# Patient Record
Sex: MALE | Race: WHITE | NOT HISPANIC OR LATINO | Employment: FULL TIME | URBAN - NONMETROPOLITAN AREA
[De-identification: names, ages, dates, MRNs, and addresses within clinical notes are randomized per-mention and may not be internally consistent; named-entity substitution may affect disease eponyms.]

---

## 2017-02-13 ENCOUNTER — AMBULATORY - GICH (OUTPATIENT)
Dept: SCHEDULING | Facility: OTHER | Age: 62
End: 2017-02-13

## 2017-02-17 ENCOUNTER — TELEPHONE (OUTPATIENT)
Dept: OPHTHALMOLOGY | Facility: CLINIC | Age: 62
End: 2017-02-17

## 2017-02-17 DIAGNOSIS — H05.89 ORBITAL MASS: Primary | ICD-10-CM

## 2017-02-17 NOTE — TELEPHONE ENCOUNTER
Last MRI done about 3 years ago per pt  No new symptoms per pt since last seen    Pt has appt feb 27th with dr. Haq and would like to know if MRI should be done prior to appt  H/o orbital mass    Pt aware facilitator/dr. haq to review Monday and f/u with pt  Jose Juan Harris RN 11:21 AM 02/17/17

## 2017-02-17 NOTE — TELEPHONE ENCOUNTER
----- Message from Abigail Wall sent at 2/17/2017 10:17 AM CST -----  Regarding: New Appt - Dr Linder  Contact: [phone]  The pt would like an eye exam the same day, and is wondering if Dr Linder would like an MRI prior to the appt?  Please call the pt at [phone] to let him know if he needs to come in early for the MRI.    Thanks - Abigail    Please DO NOT send this message and/or reply back to sender.  Call Center Representatives DO NOT respond to messages.

## 2017-02-20 ENCOUNTER — HISTORY (OUTPATIENT)
Dept: UROLOGY | Facility: OTHER | Age: 62
End: 2017-02-20

## 2017-02-20 ENCOUNTER — OFFICE VISIT - GICH (OUTPATIENT)
Dept: UROLOGY | Facility: OTHER | Age: 62
End: 2017-02-20

## 2017-02-20 ENCOUNTER — COMMUNICATION - GICH (OUTPATIENT)
Dept: UROLOGY | Facility: OTHER | Age: 62
End: 2017-02-20

## 2017-02-20 ENCOUNTER — AMBULATORY - GICH (OUTPATIENT)
Dept: UROLOGY | Facility: OTHER | Age: 62
End: 2017-02-20

## 2017-02-20 DIAGNOSIS — I10 ESSENTIAL (PRIMARY) HYPERTENSION: ICD-10-CM

## 2017-02-20 DIAGNOSIS — E78.2 MIXED HYPERLIPIDEMIA: ICD-10-CM

## 2017-02-20 DIAGNOSIS — E66.9 OBESITY: ICD-10-CM

## 2017-02-20 DIAGNOSIS — K44.9 DIAPHRAGMATIC HERNIA WITHOUT OBSTRUCTION OR GANGRENE: ICD-10-CM

## 2017-02-20 DIAGNOSIS — R97.20 ELEVATED PROSTATE SPECIFIC ANTIGEN (PSA): ICD-10-CM

## 2017-02-20 LAB — PSA TOTAL (DIAGNOSTIC) - HISTORICAL: 3 NG/ML

## 2017-02-22 DIAGNOSIS — D31.60 BENIGN NEOPLASM OF ORBIT, UNSPECIFIED LATERALITY: Primary | ICD-10-CM

## 2017-02-22 RX ORDER — DIAZEPAM 10 MG
10 TABLET ORAL EVERY 6 HOURS PRN
Qty: 1 TABLET | Refills: 0 | Status: SHIPPED | OUTPATIENT
Start: 2017-02-22 | End: 2018-12-05

## 2017-02-22 RX ORDER — DIAZEPAM 10 MG
10 TABLET ORAL EVERY 6 HOURS PRN
Qty: 1 TABLET | Refills: 0 | Status: SHIPPED | OUTPATIENT
Start: 2017-02-22 | End: 2017-02-22

## 2017-02-27 ENCOUNTER — OFFICE VISIT (OUTPATIENT)
Dept: OPHTHALMOLOGY | Facility: CLINIC | Age: 62
End: 2017-02-27

## 2017-02-27 DIAGNOSIS — H05.89 ORBITAL MASS: Primary | ICD-10-CM

## 2017-02-27 DIAGNOSIS — J34.1 MUCOUS RETENTION CYST OF MAXILLARY SINUS: ICD-10-CM

## 2017-02-27 DIAGNOSIS — J32.2 CHRONIC ETHMOIDAL SINUSITIS: ICD-10-CM

## 2017-02-27 RX ORDER — ZOLPIDEM TARTRATE 5 MG/1
TABLET ORAL
COMMUNITY
Start: 2017-02-02

## 2017-02-27 RX ORDER — TAMSULOSIN HYDROCHLORIDE 0.4 MG/1
CAPSULE ORAL
COMMUNITY
Start: 2017-02-20 | End: 2019-11-21

## 2017-02-27 RX ORDER — ATENOLOL AND CHLORTHALIDONE TABLET 50; 25 MG/1; MG/1
TABLET ORAL
COMMUNITY
Start: 2017-02-20 | End: 2018-03-05

## 2017-02-27 RX ORDER — SILDENAFIL 50 MG/1
TABLET, FILM COATED ORAL
COMMUNITY
Start: 2016-02-05 | End: 2018-03-05

## 2017-02-27 ASSESSMENT — SLIT LAMP EXAM - LIDS
COMMENTS: NORMAL
COMMENTS: NORMAL

## 2017-02-27 ASSESSMENT — VISUAL ACUITY
OS_SC+: -1
OS_SC: 20/20
OD_SC: 20/20
METHOD: SNELLEN - LINEAR

## 2017-02-27 ASSESSMENT — CUP TO DISC RATIO
OS_RATIO: 0.3
OD_RATIO: 0.3

## 2017-02-27 ASSESSMENT — TONOMETRY
OD_IOP_MMHG: 15
OS_IOP_MMHG: 15
IOP_METHOD: TONOPEN

## 2017-02-27 ASSESSMENT — CONF VISUAL FIELD
OD_NORMAL: 1
METHOD: COUNTING FINGERS
OS_NORMAL: 1

## 2017-02-27 ASSESSMENT — EXTERNAL EXAM - RIGHT EYE: OD_EXAM: NORMAL

## 2017-02-27 ASSESSMENT — EXTERNAL EXAM - LEFT EYE: OS_EXAM: NORMAL

## 2017-02-27 NOTE — LETTER
2017         RE:  :  MRN: Malik Israel  1955  [id number]     Dear ,    Thank you for asking me to see your patient, Malik Israel, for an oculoplastic   consultation.  My assessment and plan are below.  For further details, please see my attached clinic note.          Assessment & Plan     Malik Israel is a 61 year old male with the following diagnoses:   1. Orbital mass    2. Chronic ethmoidal sinusitis    3. Mucous retention cyst of maxillary sinus     differential diagnosis - hemangioma, varix, lymphangioma    Mass on MRI today appears stable/smaller, no new visual symptoms  MRI shows ethmoid and maxillary sinus changes  G-top normal today  Augmentin 875mg orally twice a day for 10 days    Return to clinic 1 year - exam and gtop  Repeat imagaging in 2 years          Again, thank you for allowing me to participate in the care of your patient.      Sincerely,    Sander Linder MD  Department of Ophthalmology and Visual Neurosciences  [facility]    CC: Anabella Ro NP  [facility] [address]  VIA Facsimile: [phone]      n/a

## 2017-02-27 NOTE — NURSING NOTE
Chief Complaints and History of Present Illnesses   Patient presents with     Follow Up For     Orbital mass     HPI    Affected eye(s):  Both   Symptoms:     No blurred vision   No decreased vision      Frequency:  Constant       Do you have eye pain now?:  No      Comments:  Pt states no changes to vision only wears gls for reading. No pain. No drops.    Sai CASH 9:04 AM February 27, 2017

## 2017-02-27 NOTE — MR AVS SNAPSHOT
After Visit Summary   2/27/2017    Malik Israel    MRN: [id number]           Patient Information     Date Of Birth          1955        Visit Information        Provider Department      2/27/2017 9:00 AM Sander Linder MD [facility]        Today's Diagnoses     Orbital mass    -  1    Chronic ethmoidal sinusitis        Mucous retention cyst of maxillary sinus           Follow-ups after your visit        Follow-up notes from your care team     Return in about 1 year (around 2/27/2018) for RETURN OCULOPLASTICS, IOP and DILATE, GTOP.      Who to contact     Please call your clinic at [phone] to:    Ask questions about your health    Make or cancel appointments    Discuss your medicines    Learn about your test results    Speak to your doctor   If you have compliments or concerns about an experience at your clinic, or if you wish to file a complaint, please contact [facility] Patient Relations at [phone] or email us at [email or web address]         Additional Information About Your Visit        MyChart Information     [employer] gives you secure access to your electronic health record. If you see a primary care provider, you can also send messages to your care team and make appointments. If you have questions, please call your primary care clinic.  If you do not have a primary care provider, please call [phone] and they will assist you.      [employer] is an electronic gateway that provides easy, online access to your medical records. With [employer], you can request a clinic appointment, read your test results, renew a prescription or communicate with your care team.     To access your existing account, please contact your [facility] or call [phone] for assistance.        Care EveryWhere ID     This is your Care EveryWhere ID. This could be used by other organizations to access your [facility]  records  [id number]         Blood Pressure from Last 3 Encounters:   03/26/14 118/80   02/27/14 118/84    Weight from Last 3 Encounters:   03/26/14 102.1 kg (225 lb)   02/27/14 92.1 kg (203 lb)              We Performed the Following     Glaucoma Top OU          Today's Medication Changes          These changes are accurate as of: 2/27/17 10:11 AM.  If you have any questions, ask your nurse or doctor.               Start taking these medicines.        Dose/Directions    amoxicillin-clavulanate 875-125 MG per tablet   Commonly known as:  AUGMENTIN   Used for:  Chronic ethmoidal sinusitis   Started by:  Sander Linder MD        Dose:  1 tablet   Take 1 tablet by mouth 2 times daily   Quantity:  20 tablet   Refills:  0            Where to get your medicines      These medications were sent to [address] [place] [address] [address] [address]    Hours:  TRANSPLANT PHONE NUMBER [phone] Phone:  [phone]     amoxicillin-clavulanate 875-125 MG per tablet                Primary Care Provider Office Phone # Fax #    Anabella Foremanzdanich [phone]       [facility] [address] [facility] [address]        Thank you!     Thank you for choosing [facility]  for your care. Our goal is always to provide you with excellent care. Hearing back from our patients is one way we can continue to improve our services. Please take a few minutes to complete the written survey that you may receive in the mail after your visit with us. Thank you!             Your Updated Medication List - Protect others around you: Learn how to safely use, store and throw away your medicines at www.disposemymeds.org.          This list is accurate as of: 2/27/17 10:11 AM.  Always use your most recent med list.                   Brand Name Dispense Instructions for use    amoxicillin-clavulanate 875-125 MG per tablet    AUGMENTIN    20  tablet    Take 1 tablet by mouth 2 times daily       ATENOLOL PO      Take 5 mg by mouth daily       atenolol-chlorthalidone 50-25 MG per tablet    TENORETIC     TAKE 1/2 TABLET BY MOUTH EVERY DAY       CENTRUM Tabs          CITRUCEL PO          diazepam 10 MG tablet    VALIUM    1 tablet    Take 1 tablet (10 mg) by mouth every 6 hours as needed for anxiety or sleep Take 30-60 minutes before procedure.  Do not operate a vehicle after taking this medication.       fish oil-omega-3 fatty acids 1000 MG capsule      Take by mouth daily       MULTI-VITAMIN DAILY PO          PREVACID 30 MG CR capsule   Generic drug:  LANsoprazole      Take 30 mg by mouth daily       sildenafil 50 MG cap/tab    REVATIO/VIAGRA     Use as directed       tamsulosin 0.4 MG capsule    FLOMAX     TAKE 1 CAPSULE BY MOUTH EVERY DAY. SWALLOW WHOLE. DO NOT CRUSH, CHEW, OR OPEN       zolpidem 5 MG tablet    AMBIEN     TAKE 1-2 TABLETS BY MOUTH AT BEDTIME

## 2017-02-27 NOTE — PROGRESS NOTES
Chief Complaints and History of Present Illnesses   Patient presents with     Follow Up For     Orbital mass     Malik Israel is a 61 year old male who has history of an orbital mass found incidentally. Reports vision has been stable, no new headaches. In January, he reports had some pre-syncopal episodes, he thinks associated with a viral illness as he had lymphadenopathy several days afterwards. Does reports he has a sharp pain in his left eye intermittently for a few seconds. Has only occurred a couple times. No vision changes. Also notes sinus sxs for last 1.5 months.          Assessment & Plan     Malik Israel is a 61 year old male with the following diagnoses:   1. Orbital mass    2. Chronic ethmoidal sinusitis    3. Mucous retention cyst of maxillary sinus     differential diagnosis - hemangioma, varix, lymphangioma    Mass on MRI today appears stable/smaller, no new visual symptoms  MRI shows ethmoid and maxillary sinus changes  G-top normal today  Augmentin 875mg orally twice a day for 10 days    Return to clinic 1 year - exam and gtop  Repeat imagaging in 2 years         Attending Physician Attestation:  I have seen and examined this patient .  I have confirmed and edited as necessary the chief complaint(s), history of present illness, review of systems, relevant history, and examination findings as documented by others.  I have personally reviewed the relevant tests, images, and reports as documented above.  I have confirmed and edited as necessary the assessment and plan and agree with this note.    - Sander Linder MD 9:55 AM 2/27/2017    Photographs were obtained to document the findings recorded under exam. These will be stored for future reference and comparison. The plan is documented under assessment and plan above.   - Sander Linder MD 9:55 AM 2/27/2017    Nestor Wolf MD

## 2017-11-15 ENCOUNTER — AMBULATORY - GICH (OUTPATIENT)
Dept: UROLOGY | Facility: OTHER | Age: 62
End: 2017-11-15

## 2017-11-15 ENCOUNTER — COMMUNICATION - GICH (OUTPATIENT)
Dept: UROLOGY | Facility: OTHER | Age: 62
End: 2017-11-15

## 2017-11-15 DIAGNOSIS — R97.20 ELEVATED PROSTATE SPECIFIC ANTIGEN (PSA): ICD-10-CM

## 2017-11-28 ENCOUNTER — AMBULATORY - GICH (OUTPATIENT)
Dept: LAB | Facility: OTHER | Age: 62
End: 2017-11-28

## 2017-11-28 DIAGNOSIS — R97.20 ELEVATED PROSTATE SPECIFIC ANTIGEN (PSA): ICD-10-CM

## 2017-11-28 LAB — PSA TOTAL (DIAGNOSTIC) - HISTORICAL: 3.4 NG/ML

## 2017-11-29 ENCOUNTER — OFFICE VISIT - GICH (OUTPATIENT)
Dept: UROLOGY | Facility: OTHER | Age: 62
End: 2017-11-29

## 2017-11-29 ENCOUNTER — HISTORY (OUTPATIENT)
Dept: UROLOGY | Facility: OTHER | Age: 62
End: 2017-11-29

## 2017-11-29 DIAGNOSIS — N52.9 MALE ERECTILE DYSFUNCTION: ICD-10-CM

## 2017-11-29 DIAGNOSIS — R97.20 ELEVATED PROSTATE SPECIFIC ANTIGEN (PSA): ICD-10-CM

## 2017-12-28 NOTE — PROGRESS NOTES
"Patient Information     Patient Name MRMalik Kiser III [id number] Male 1955      Progress Notes by Linus Obando MD at 2017  8:15 AM     Author:  Linus Obando MD Service:  (none) Author Type:  Physician     Filed:  2017  9:41 AM Encounter Date:  2017 Status:  Signed     :  Linus Obando MD (Physician)            PCP:   SHARON Cabrera    Type of Visit  EST    Chief Complaint  Elevated PSA    HPI  Mr. Lindy ELENA is a 61 y.o. male who follows up with an elevated PSA.  He does not have a family history of prostate cancer.  The patient has not previously undergone prostate biopsy.  No associated worsening LUTS, dysuria or prostatitis at the time of the PSA.  The most recent PSA was collected yesterday.  I initially saw the patient 9 months ago with an elevated PSA.  We had discussed prostate biopsy however we did do a recheck same day of clinic visit and I returned lower so we elected to defer and follow-up PSA.      Family History  No family history of urologic cancers    Review of Systems  I personally reviewed the ROS with the patient.    Nursing Notes:   Magdalena Joyner RN  2017  8:20 AM  Signed  Review of Systems:    Weight loss:    No     Recent fever/chills:  No   Night sweats:   No  Current skin rash:  No   Recent hair loss:  No  Heat intolerance:  No   Cold intolerance:  No  Chest pain:   No   Palpitations:   No  Shortness of breath:  No   Wheezing:   No  Constipation:    No   Diarrhea:   No   Nausea:   No   Vomiting:   No   Kidney/side pain:  No   Back pain:   No  Frequent headaches:  No   Dizziness:     No  Leg swelling:   No   Calf pain:    No      Physical Exam  Vitals:     17 1111   BP: 122/76   Resp: 12   Weight: 99.6 kg (219 lb 9.6 oz)   Height: 1.765 m (5' 9.5\")   Constitutional: No acute distress.  Alert and cooperative   Head: NCAT  Eyes: Conjunctivae normal  Cardiovascular: Regular rate.  Pulmonary/Chest: Respirations are even and " non-labored bilaterally, no audible wheezing  Abdominal: Soft. No distension, tenderness, masses or guarding.   Neurological: A + O x 3.  Cranial Nerves II-XII grossly intact.  Extremities: CARMEN x 4, Warm. No clubbing.  No cyanosis.    Skin: Pink, warm and dry.  No visible rashes noted.  Psychiatric:  Normal mood and affect  Back:  No left CVA tenderness.  No right CVA tenderness.  Genitourinary:  Nonpalpable bladder  LLUVIA today revealed a 30 g prostate, no nodules, no asymmetry, no tenderness    Labs  PSA 4.26 1/16/2017    Results for ERIC JOSHI III (MRN [id number]) as of 11/29/2017 08:14   2/20/2017 11:54 11/28/2017 08:35   PSA TOTAL (DIAGNOSTIC) 3.000 3.398 (H)       Assessment  Mr. Lindy ELENA is a 62 y.o. male who follows up with elevated but stable PSA.  Normal LLUVIA.  Discussed the indications for prostate biopsy.  Also discussed the alternative of close PSA surveillance.  I explained to the patient that an elevated PSA is a marker of risk of prostate cancer and a prostate biopsy oftentimes is the next step.    We discussed the following percentages:    5% Risk of high grade cancer detected on biopsy  17% Risk of low grade cancer detected on biopsy  78% Chance of benign biopsy    Plan  Follow up with me in 1 year with PSA and PVR  Continue Flomax 0.4mg daily

## 2017-12-28 NOTE — TELEPHONE ENCOUNTER
Patient Information     Patient Name MRN Malik Whitt III [id number] Male 1955      Telephone Encounter by Aav Burns at 11/15/2017  9:16 AM     Author:  Ava Burns Service:  (none) Author Type:  (none)     Filed:  11/15/2017  9:16 AM Encounter Date:  11/15/2017 Status:  Signed     :  Ava Burns            Order is done and signed.  Ava Burns LPN  11/15/2017  9:16 AM

## 2017-12-28 NOTE — TELEPHONE ENCOUNTER
Patient Information     Patient Name MRN Sex Malik Kunz III [id number] Male 1955      Telephone Encounter by Payton Rivera at 11/15/2017  9:08 AM     Author:  Payton Rivera Service:  (none) Author Type:  (none)     Filed:  11/15/2017  9:09 AM Encounter Date:  11/15/2017 Status:  Signed     :  Payton Rivera            This patient is coming for lab on , please place order  Thank you

## 2017-12-30 NOTE — NURSING NOTE
Patient Information     Patient Name MRN Sex Malik Kunz III [id number] Male 1955      Nursing Note by Magdalena Joyner RN at 2017  8:15 AM     Author:  Magdalena Joyner RN Service:  (none) Author Type:  NURS- Registered Nurse     Filed:  2017  8:20 AM Encounter Date:  2017 Status:  Signed     :  Magdalena Joyner RN (NURS- Registered Nurse)            Review of Systems:    Weight loss:    No     Recent fever/chills:  No   Night sweats:   No  Current skin rash:  No   Recent hair loss:  No  Heat intolerance:  No   Cold intolerance:  No  Chest pain:   No   Palpitations:   No  Shortness of breath:  No   Wheezing:   No  Constipation:    No   Diarrhea:   No   Nausea:   No   Vomiting:   No   Kidney/side pain:  No   Back pain:   No  Frequent headaches:  No   Dizziness:     No  Leg swelling:   No   Calf pain:    No

## 2018-01-03 NOTE — NURSING NOTE
Patient Information     Patient Name MRMalik Kiser III [id number] Male 1955      Nursing Note by Ava Burns at 2017 11:15 AM     Author:  Ava Burns Service:  (none) Author Type:  (none)     Filed:  2017 11:24 AM Encounter Date:  2017 Status:  Signed     :  Ava Burns            Here for elevated PSA.  Review of Systems:    Weight loss:    No     Recent fever/chills:  No   Night sweats:   No  Current skin rash:  No   Recent hair loss:  No  Heat intolerance:  No   Cold intolerance:  No  Chest pain:   No   Palpitations:   No  Shortness of breath:  No   Wheezing:   No  Constipation:    Yes   Diarrhea:   Yes   Nausea:   No   Vomiting:   No   Kidney/side pain:  No   Back pain:   No  Frequent headaches:  No   Dizziness:     No  Leg swelling:   No   Calf pain:    No        Parents, brothers or sisters with history of kidney cancer?   No  Parents, brothers or sisters with history of bladder cancer: No    Ava Burns LPN  2017  11:13 AM

## 2018-01-03 NOTE — TELEPHONE ENCOUNTER
Patient Information     Patient Name MRMalik Kiser III [id number] Male 1955      Telephone Encounter by Ava Burns at 2017  1:43 PM     Author:  Ava Burns Service:  (none) Author Type:  (none)     Filed:  2017  1:50 PM Encounter Date:  2017 Status:  Signed     :  Ava Burns              Per Linus Obando MD  PSA came back as 3.0   For his age that is a borderline value.   Let's hold off on the biopsy.   If he wants to still do the biopsy we can.   I would just plan on seeing him back in 6 months with a PSA.  Pt notified and he will do the PSA in 6 months and see Dr. Obando after.  Ava Burns LPN  2017  1:49 PM

## 2018-01-03 NOTE — PROGRESS NOTES
Patient Information     Patient Name MRMARTINA Sex     Malik Israel III [id number] Male 1955      Progress Notes by Linus Obando MD at 2017 11:15 AM     Author:  Linus Obando MD Service:  (none) Author Type:  Physician     Filed:  2017 11:58 AM Encounter Date:  2017 Status:  Signed     :  Linus Obando MD (Physician)            I was asked to see this patient by SHARON Cabrera and provide my opinion about the following:  Elevated PSA    Type of Visit  Consult    Chief Complaint  Elevated PSA    HPI  Mr. Lindy ELENA is a 61 y.o. male who presents with an elevated PSA.  He does not have a family history of prostate cancer.  The patient has not previously undergone prostate biopsy.  No associated worsening LUTS, dysuria or prostatitis at the time of the PSA.  The most recent PSA was collected 1 month(s) ago.    He states his historical PSAs have been trending upward.  He does have some urinary complaints, primarily nocturia, frequency and urgency.  He has been on Flomax with some improvement in nocturia and frequency.      Past Medical History  He  has a past medical history of Acute bacterial sinusitis (10/12/2007); Asbestosis (HC) (2007); Colitis (2006); Contusion of elbow (2007); ED (erectile dysfunction); Elevated cholesterol (22081); Esophageal reflux (2007); Insomnia (2007); Irritable bowel syndrome (2007); Polycystic kidney (2006); Tick bite (2009); and Unspecified essential hypertension (2007).  Patient Active Problem List     Diagnosis  Code     Essential hypertension I10     Ulcerative colitis (HC) K51.90     Medial epicondylitis M77.00     ED (erectile dysfunction) N52.9     Esophageal reflux K21.9     Hiatal hernia K44.9     Obesity (BMI 30.0-34.9) E66.9     Mixed hyperlipidemia E78.2       Past Surgical History  He  has no past surgical history on file.    Medications  He has a current medication list which includes  "the following prescription(s): atenolol-chlorthalidone (50-25 mg), lansoprazole, multivitamin cmb no.21-iron-fa, sildenafil citrate, tamsulosin, and zolpidem.    Allergies  No Known Allergies    Social History  He  reports that he has never smoked. He has never used smokeless tobacco. He reports that he drinks alcohol. He reports that he does not use illicit drugs.  No drug abuse.    Family History  No family history of urologic cancers    Review of Systems  I personally reviewed the ROS with the patient.    Nursing Notes:   Ava Burns  2/20/2017 11:24 AM  Signed  Here for elevated PSA.  Review of Systems:    Weight loss:    No     Recent fever/chills:  No   Night sweats:   No  Current skin rash:  No   Recent hair loss:  No  Heat intolerance:  No   Cold intolerance:  No  Chest pain:   No   Palpitations:   No  Shortness of breath:  No   Wheezing:   No  Constipation:    Yes   Diarrhea:   Yes   Nausea:   No   Vomiting:   No   Kidney/side pain:  No   Back pain:   No  Frequent headaches:  No   Dizziness:     No  Leg swelling:   No   Calf pain:    No        Parents, brothers or sisters with history of kidney cancer?   No  Parents, brothers or sisters with history of bladder cancer: No    Ava Oakleyer LPN  2/20/2017  11:13 AM          Physical Exam  Vitals:     02/20/17 1111   BP: 122/76   Resp: 12   Weight: 99.6 kg (219 lb 9.6 oz)   Height: 1.765 m (5' 9.5\")   Constitutional: No acute distress.  Alert and cooperative   Head: NCAT  Eyes: Conjunctivae normal  Cardiovascular: Regular rate.  Pulmonary/Chest: Respirations are even and non-labored bilaterally, no audible wheezing  Abdominal: Soft. No distension, tenderness, masses or guarding.   Neurological: A + O x 3.  Cranial Nerves II-XII grossly intact.  Extremities: CARMEN x 4, Warm. No clubbing.  No cyanosis.    Skin: Pink, warm and dry.  No visible rashes noted.  Psychiatric:  Normal mood and affect  Back:  No left CVA tenderness.  No right CVA " tenderness.  Genitourinary:  Nonpalpable bladder    Labs  PSA 4.26 1/16/2017    Assessment  Mr. Lindy ELENA is a 61 y.o. male who presents with elevated PSA.  Discussed the risks of biopsy leading to overdiagnosis and overtreatment.  I explained to the patient that an elevated PSA is a marker of risk of prostate cancer and a prostate biopsy oftentimes is the next step if diagnosis is the goal.  I explained that sampling error can occur with any biopsy and there is a risk of potentially missing a cancer that may be present.    5% Risk of high grade cancer detected on biopsy  18% Risk of low grade cancer detected on biopsy  77% Chance of benign biopsy    I discussed the risks, benefits, and alternatives to prostate biopsy, including hematuria, hematochezia, and hematospermia.  I also discussed the risk of diagnosing a clinically-insignificant prostate cancer.  I discussed the risks of sepsis, which can be minimized by prophylactic antibiotics.     Discussed anticholinergics for his overactive bladder symptoms however given the potential side effects and the low degree of bother he prefers to avoid them.    Plan  PSA today  Continue Flomax 0.4mg daily  Recommended TRUS biopsy of prostate   Gentamicin 120mg IM prior to the biopsy.   Ciprofloxacin 500mg po bid for 3 days to start the day prior to biopsy.   Patient denies taking antiplatelets or anticoagulant medication.   Xanax 1mg    -will have a

## 2018-01-03 NOTE — PROGRESS NOTES
Patient Information     Patient Name MRN Sex Malik Kunz III [id number] Male 1955      Progress Notes by Linus Obando MD at 2017 11:15 AM     Author:  Linus Obando MD Service:  (none) Author Type:  Physician     Filed:  2017 11:58 AM Encounter Date:  2017 Status:  Signed     :  Linus Obando MD (Physician)            Disregard

## 2018-01-25 ENCOUNTER — DOCUMENTATION ONLY (OUTPATIENT)
Dept: FAMILY MEDICINE | Facility: OTHER | Age: 63
End: 2018-01-25

## 2018-01-25 VITALS
BODY MASS INDEX: 31.44 KG/M2 | RESPIRATION RATE: 12 BRPM | WEIGHT: 217.8 LBS | HEIGHT: 69 IN | WEIGHT: 219.6 LBS | HEIGHT: 70 IN | RESPIRATION RATE: 20 BRPM | SYSTOLIC BLOOD PRESSURE: 122 MMHG | BODY MASS INDEX: 32.26 KG/M2 | DIASTOLIC BLOOD PRESSURE: 76 MMHG | DIASTOLIC BLOOD PRESSURE: 60 MMHG | SYSTOLIC BLOOD PRESSURE: 110 MMHG | HEART RATE: 56 BPM

## 2018-01-25 PROBLEM — N52.9 ED (ERECTILE DYSFUNCTION): Status: ACTIVE | Noted: 2017-02-20

## 2018-01-25 PROBLEM — K51.90 ULCERATIVE COLITIS (H): Status: ACTIVE | Noted: 2017-02-20

## 2018-01-25 PROBLEM — K21.9 ESOPHAGEAL REFLUX: Status: ACTIVE | Noted: 2017-02-20

## 2018-01-25 PROBLEM — E66.811 OBESITY (BMI 30.0-34.9): Status: ACTIVE | Noted: 2017-02-20

## 2018-01-25 PROBLEM — R97.20 ELEVATED PSA: Status: ACTIVE | Noted: 2017-11-29

## 2018-01-25 PROBLEM — I10 ESSENTIAL HYPERTENSION: Status: ACTIVE | Noted: 2017-02-20

## 2018-01-25 PROBLEM — E78.2 MIXED HYPERLIPIDEMIA: Status: ACTIVE | Noted: 2017-02-20

## 2018-01-25 PROBLEM — M77.00 MEDIAL EPICONDYLITIS: Status: ACTIVE | Noted: 2017-02-20

## 2018-01-25 PROBLEM — K44.9 HIATAL HERNIA: Status: ACTIVE | Noted: 2017-02-20

## 2018-01-25 RX ORDER — SILDENAFIL 50 MG/1
50 TABLET, FILM COATED ORAL DAILY PRN
COMMUNITY
Start: 2017-02-20 | End: 2019-11-21

## 2018-01-25 RX ORDER — LANSOPRAZOLE 30 MG/1
1 CAPSULE, DELAYED RELEASE ORAL DAILY
COMMUNITY
Start: 2016-08-02

## 2018-01-25 RX ORDER — CIPROFLOXACIN 500 MG/1
TABLET, FILM COATED ORAL
COMMUNITY
Start: 2017-02-20 | End: 2018-03-05

## 2018-03-05 ENCOUNTER — OFFICE VISIT (OUTPATIENT)
Dept: OPHTHALMOLOGY | Facility: CLINIC | Age: 63
End: 2018-03-05
Payer: COMMERCIAL

## 2018-03-05 DIAGNOSIS — J34.1 MUCOUS RETENTION CYST OF MAXILLARY SINUS: ICD-10-CM

## 2018-03-05 DIAGNOSIS — H05.89 ORBITAL MASS: Primary | ICD-10-CM

## 2018-03-05 DIAGNOSIS — J32.2 CHRONIC ETHMOIDAL SINUSITIS: ICD-10-CM

## 2018-03-05 RX ORDER — LISINOPRIL AND HYDROCHLOROTHIAZIDE 20; 25 MG/1; MG/1
TABLET ORAL
COMMUNITY
Start: 2018-02-10 | End: 2021-11-01

## 2018-03-05 ASSESSMENT — VISUAL ACUITY
OD_SC: 20/20
OS_SC: 20/20
METHOD: SNELLEN - LINEAR

## 2018-03-05 ASSESSMENT — TONOMETRY
OS_IOP_MMHG: 16
OD_IOP_MMHG: 18
IOP_METHOD: ICARE

## 2018-03-05 ASSESSMENT — CONF VISUAL FIELD
OD_NORMAL: 1
OS_NORMAL: 1

## 2018-03-05 ASSESSMENT — CUP TO DISC RATIO
OD_RATIO: 0.3
OS_RATIO: 0.3

## 2018-03-05 ASSESSMENT — EXTERNAL EXAM - RIGHT EYE: OD_EXAM: NORMAL

## 2018-03-05 ASSESSMENT — EXTERNAL EXAM - LEFT EYE: OS_EXAM: NORMAL

## 2018-03-05 ASSESSMENT — SLIT LAMP EXAM - LIDS
COMMENTS: NORMAL
COMMENTS: NORMAL

## 2018-03-05 NOTE — PROGRESS NOTES
Chief Complaints and History of Present Illnesses   Patient presents with     Follow Up For     Orbital mass     Malik Israel is a 61 year old male who has history of an orbital mass found incidentally. Reports vision has been stable, no new headaches.        Assessment & Plan     Malik Israel is a 61 year old male with the following diagnoses:   1. Orbital mass    2. Chronic ethmoidal sinusitis    3. Mucous retention cyst of maxillary sinus     differential diagnosis - hemangioma, varix, lymphangioma    Mass on MRI, no new visual symptoms  MRI shows ethmoid and maxillary sinus changes  G-top remains normal today    Return to clinic 1 year - exam and repeat imaging.    Christie Dumas MD  Ophthalmic Plastic and Reconstructive Surgery Fellow    Attending Physician Attestation:  Complete documentation of historical and exam elements from today's encounter can be found in the full encounter summary report (not reduplicated in this progress note).  I personally obtained the chief complaint(s) and history of present illness.  I confirmed and edited as necessary the review of systems, past medical/surgical history, family history, social history, and examination findings as documented by others; and I examined the patient myself.  I personally reviewed the relevant tests, images, and reports as documented above.  I formulated and edited as necessary the assessment and plan and discussed the findings and management plan with the patient and family. I personally reviewed the ophthalmic test(s) associated with this encounter, agree with the interpretation(s) as documented by the resident/fellow, and have edited the corresponding report(s) as necessary.   -Sander Linder MD

## 2018-03-05 NOTE — MR AVS SNAPSHOT
After Visit Summary   3/5/2018    Malik Israel    MRN: [id number]           Patient Information     Date Of Birth          1955        Visit Information        Provider Department      3/5/2018 8:00 AM Sander Linder MD [facility] Ophthalmology        Today's Diagnoses     Orbital mass    -  1    Chronic ethmoidal sinusitis        Mucous retention cyst of maxillary sinus           Follow-ups after your visit        Follow-up notes from your care team     Return in about 1 year (around 3/5/2019) for RETURN OCULOPLASTICS, IOP and DILATE, GTOP, RNFL OCT.      Your next 10 appointments already scheduled     Mar 04, 2019  7:45 AM CST   (Arrive by 7:30 AM)   MR ORBIT W/O & W CONTRAST with [address] [facility] [place] [facility] ([facility] [[email or web address] and [facility] [place])    [address] [address] [phone]           Take your medicines as usual, unless your doctor tells you not to. Bring a list of your current medicines to your exam (including vitamins, minerals and over-the-counter drugs).  You may or may not receive intravenous (IV) contrast for this exam pending the discretion of the Radiologist.  You do not need to do anything special to prepare.  The MRI machine uses a strong magnet. Please wear clothes without metal (snaps, zippers). A sweatsuit works well, or we may give you a hospital gown.  Please remove any body piercings and hair extensions before you arrive. You will also remove watches, jewelry, hairpins, wallets, dentures, partial dental plates and hearing aids. You may wear contact lenses, and you may be able to wear your rings. We have a safe place to keep your personal items, but it is safer to leave them at home.  **IMPORTANT** THE INSTRUCTIONS BELOW ARE ONLY FOR THOSE PATIENTS WHO HAVE BEEN PRESCRIBED SEDATION OR GENERAL ANESTHESIA DURING THEIR MRI PROCEDURE:  IF YOUR DOCTOR PRESCRIBED ORAL SEDATION (take medicine to help you relax during your exam):    You must get the medicine from your doctor (oral medication) before you arrive. Bring the medicine to the exam. Do not take it at home. You ll be told when to take it upon arriving for your exam.   Arrive one hour early. Bring someone who can take you home after the test. Your medicine will make you sleepy. After the exam, you may not drive, take a bus or take a taxi by yourself.  IF YOUR DOCTOR PRESCRIBED IV SEDATION:   Arrive one hour early. Bring someone who can take you home after the test. Your medicine will make you sleepy. After the exam, you may not drive, take a bus or take a taxi by yourself.   No eating 6 hours before your exam. You may have clear liquids up until 4 hours before your exam. (Clear liquids include water, clear tea, black coffee and fruit juice without pulp.)  IF YOUR DOCTOR PRESCRIBED ANESTHESIA (be asleep for your exam):   Arrive 1 1/2 hours early. Bring someone who can take you home after the test. You may not drive, take a bus or take a taxi by yourself.   No eating 8 hours before your exam. You may have clear liquids up until 4 hours before your exam. (Clear liquids include water, clear tea, black coffee and fruit juice without pulp.)   You will spend four to five hours in the recovery room.  Please call the Imaging Department at your exam site with any questions.            Mar 04, 2019  8:30 AM CST   (Arrive by 8:15 AM)   RETURN PLASTICS with Sander Linder MD   [facility] ([facility])    [address] [place] [address] [phone]              Future tests that were ordered for you today     Open Future Orders        Priority Expected Expires Ordered    MR Orbit w/o & w Contrast Routine  3/5/2019 3/5/2018            Who to contact     Please call your clinic at [phone] to:    Ask questions about your health    Make or cancel appointments    Discuss your medicines    Learn about your test results    Speak to  your doctor            Additional Information About Your Visit        [employer] Information     [employer] gives you secure access to your electronic health record. If you see a primary care provider, you can also send messages to your care team and make appointments. If you have questions, please call your primary care clinic.  If you do not have a primary care provider, please call [phone] and they will assist you.      [employer] is an electronic gateway that provides easy, online access to your medical records. With [employer], you can request a clinic appointment, read your test results, renew a prescription or communicate with your care team.     To access your existing account, please contact your [facility] or call [phone] for assistance.        Care EveryWhere ID     This is your Care EveryWhere ID. This could be used by other organizations to access your [facility] medical records  [id number]         Blood Pressure from Last 3 Encounters:   11/29/17 110/60   02/20/17 122/76   10/05/16 130/80    Weight from Last 3 Encounters:   11/29/17 98.8 kg (217 lb 12.8 oz)   02/20/17 99.6 kg (219 lb 9.6 oz)   09/24/16 99.5 kg (219 lb 6.4 oz)              We Performed the Following     OVF 24-2 Dynamic OU          Today's Medication Changes          These changes are accurate as of 3/5/18  9:02 AM.  If you have any questions, ask your nurse or doctor.               These medicines have changed or have updated prescriptions.        Dose/Directions    LANsoprazole 30 MG CR capsule   Commonly known as:  PREVACID   This may have changed:  Another medication with the same name was removed. Continue taking this medication, and follow the directions you see here.   Changed by:  Sander Linder MD        Dose:  1 capsule   Take 1 capsule by mouth daily   Refills:  0       sildenafil 50 MG tablet   Commonly known as:  VIAGRA   This may have changed:  Another medication with the same name was  removed. Continue taking this medication, and follow the directions you see here.   Changed by:  Sander Linder MD        Dose:  50 mg   Take 50 mg by mouth daily as needed for erectile dysfunction Take 30 min to 4 hours before sexual activity. Max 100 mg in 24 hrs   Refills:  0         Stop taking these medicines if you haven't already. Please contact your care team if you have questions.     amoxicillin-clavulanate 875-125 MG per tablet   Commonly known as:  AUGMENTIN   Stopped by:  Sander Linder MD           ATENOLOL PO   Stopped by:  Sander Linder MD           atenolol-chlorthalidone 50-25 MG per tablet   Commonly known as:  TENORETIC   Stopped by:  Sander Linder MD           ciprofloxacin 500 MG tablet   Commonly known as:  CIPRO   Stopped by:  Sander Linder MD           fish oil-omega-3 fatty acids 1000 MG capsule   Stopped by:  Sander Linder MD           IRON PO   Stopped by:  Sander Linder MD           MULTI-VITAMIN DAILY PO   Stopped by:  Sander Linder MD                    Primary Care Provider Office Phone # Fax #    Anabella MACK Ahsanradha [phone]       [facility] [address] [facility] [address]        Equal Access to Services     [facility]: Hadii aad ku hadasho Soomaali, waaxda luqadaha, qaybta kaalmada adeegyada, waxay idiin hayalvarezn van saul. So [facility] [phone].    ATENCIÓN: Si habla español, tiene a sterling disposición servicios gratuitos de asistencia lingüística. Kerwin al [phone].    We comply with applicable federal civil rights laws and [place] laws. We do not discriminate on the basis of race, color, national origin, age, disability, sex, sexual orientation, or gender identity.            Thank you!     Thank you for choosing [facility]  for your care. Our goal is always to provide you with excellent care. Hearing back from our patients is one way we can continue to improve our services. Please take a  few minutes to complete the written survey that you may receive in the mail after your visit with us. Thank you!             Your Updated Medication List - Protect others around you: Learn how to safely use, store and throw away your medicines at www.disposemymeds.org.          This list is accurate as of 3/5/18  9:02 AM.  Always use your most recent med list.                   Brand Name Dispense Instructions for use Diagnosis    aspirin 81 MG tablet      Take by mouth daily        CENTRUM Tabs           CITRUCEL PO           diazepam 10 MG tablet    VALIUM    1 tablet    Take 1 tablet (10 mg) by mouth every 6 hours as needed for anxiety or sleep Take 30-60 minutes before procedure.  Do not operate a vehicle after taking this medication.    Benign neoplasm of orbit, unspecified laterality       LANsoprazole 30 MG CR capsule    PREVACID     Take 1 capsule by mouth daily        lisinopril-hydrochlorothiazide 20-25 MG per tablet    PRINZIDE/ZESTORETIC          sildenafil 50 MG tablet    VIAGRA     Take 50 mg by mouth daily as needed for erectile dysfunction Take 30 min to 4 hours before sexual activity. Max 100 mg in 24 hrs        tamsulosin 0.4 MG capsule    FLOMAX     TAKE 1 CAPSULE BY MOUTH EVERY DAY. SWALLOW WHOLE. DO NOT CRUSH, CHEW, OR OPEN        zolpidem 5 MG tablet    AMBIEN     TAKE 1-2 TABLETS BY MOUTH AT BEDTIME

## 2018-03-05 NOTE — NURSING NOTE
"Chief Complaints and History of Present Illnesses   Patient presents with     Follow Up For     1 year f/u Orbital Mass     HPI    Affected eye(s):  Both   Symptoms:     No blurred vision   No double vision   No floaters   No flashes   No Dryness         Do you have eye pain now?:  No      Comments:    Patient notes no vision changes, feels eyes are \"sore\" sometimes    Nitza Maldonado March 5, 2018 8:01 AM               "

## 2018-07-23 NOTE — PROGRESS NOTES
Patient Information     Patient Name  Malik Israel III MRN  [id number] Sex  Male   1955      Letter by Linus Obando MD at      Author:  Linus Obando MD Service:  (none) Author Type:  (none)    Filed:   Encounter Date:  2017 Status:  (Other)           Malissa Castillo CNP  [facility]  [address] [facility] [place] [address]          2017    Re:  Malik Israel III       : 1955  [address] [facility] [address]    Appointment Date: 17      Dear  Malissa,    Thank you for allowing me to take part in this patient s care.  I copied my note below from today's clinic visit for your records.  Please feel free to contact me with any questions      Warm regards,            Linus Obando MD, PharmD, [facility]  Urologist  [address] [place] [address]  Appointments: [phone]                  I was asked to see this patient by SHARON Cabrera and provide my opinion about the following:  Elevated PSA    Type of Visit  Consult    Chief Complaint  Elevated PSA    HPI  Mr. Lindy ELENA is a 61 y.o. male who presents with an elevated PSA.  He does not have a family history of prostate cancer.  The patient has not previously undergone prostate biopsy.  No associated worsening LUTS, dysuria or prostatitis at the time of the PSA.  The most recent PSA was collected 1 month(s) ago.    He states his historical PSAs have been trending upward.  He does have some urinary complaints, primarily nocturia, frequency and urgency.  He has been on Flomax with some improvement in nocturia and frequency.      Past Medical History  He  has a past medical history of Acute bacterial sinusitis (10/12/2007); Asbestosis (HC) (2007); Colitis (2006); Contusion of elbow (2007); ED (erectile dysfunction); Elevated cholesterol (41166/); Esophageal reflux (2007); Insomnia (2007); Irritable bowel syndrome (2007); Polycystic kidney  "(12/20/2006); Tick bite (06/23/2009); and Unspecified essential hypertension (03/06/2007).  Patient Active Problem List     Diagnosis  Code     Essential hypertension I10     Ulcerative colitis (HC) K51.90     Medial epicondylitis M77.00     ED (erectile dysfunction) N52.9     Esophageal reflux K21.9     Hiatal hernia K44.9     Obesity (BMI 30.0-34.9) E66.9     Mixed hyperlipidemia E78.2       Past Surgical History  He  has no past surgical history on file.    Medications  He has a current medication list which includes the following prescription(s): atenolol-chlorthalidone (50-25 mg), lansoprazole, multivitamin cmb no.21-iron-fa, sildenafil citrate, tamsulosin, and zolpidem.    Allergies  No Known Allergies    Social History  He  reports that he has never smoked. He has never used smokeless tobacco. He reports that he drinks alcohol. He reports that he does not use illicit drugs.  No drug abuse.    Family History  No family history of urologic cancers    Review of Systems  I personally reviewed the ROS with the patient.    Nursing Notes:   Ava Burns  2/20/2017 11:24 AM  Signed  Here for elevated PSA.  Review of Systems:    Weight loss:    No     Recent fever/chills:  No   Night sweats:   No  Current skin rash:  No   Recent hair loss:  No  Heat intolerance:  No   Cold intolerance:  No  Chest pain:   No   Palpitations:   No  Shortness of breath:  No   Wheezing:   No  Constipation:    Yes   Diarrhea:   Yes   Nausea:   No   Vomiting:   No   Kidney/side pain:  No   Back pain:   No  Frequent headaches:  No   Dizziness:     No  Leg swelling:   No   Calf pain:    No        Parents, brothers or sisters with history of kidney cancer?   No  Parents, brothers or sisters with history of bladder cancer: No    Ava Burns LPN  2/20/2017  11:13 AM          Physical Exam  Vitals:     02/20/17 1111   BP: 122/76   Resp: 12   Weight: 99.6 kg (219 lb 9.6 oz)   Height: 1.765 m (5' 9.5\")   Constitutional: No acute distress.  " Alert and cooperative   Head: NCAT  Eyes: Conjunctivae normal  Cardiovascular: Regular rate.  Pulmonary/Chest: Respirations are even and non-labored bilaterally, no audible wheezing  Abdominal: Soft. No distension, tenderness, masses or guarding.   Neurological: A + O x 3.  Cranial Nerves II-XII grossly intact.  Extremities: CARMEN x 4, Warm. No clubbing.  No cyanosis.    Skin: Pink, warm and dry.  No visible rashes noted.  Psychiatric:  Normal mood and affect  Back:  No left CVA tenderness.  No right CVA tenderness.  Genitourinary:  Nonpalpable bladder    Labs  PSA 4.26 1/16/2017    Assessment  Mr. Lindy ELENA is a 61 y.o. male who presents with elevated PSA.  Discussed the risks of biopsy leading to overdiagnosis and overtreatment.  I explained to the patient that an elevated PSA is a marker of risk of prostate cancer and a prostate biopsy oftentimes is the next step if diagnosis is the goal.  I explained that sampling error can occur with any biopsy and there is a risk of potentially missing a cancer that may be present.    5% Risk of high grade cancer detected on biopsy  18% Risk of low grade cancer detected on biopsy  77% Chance of benign biopsy    I discussed the risks, benefits, and alternatives to prostate biopsy, including hematuria, hematochezia, and hematospermia.  I also discussed the risk of diagnosing a clinically-insignificant prostate cancer.  I discussed the risks of sepsis, which can be minimized by prophylactic antibiotics.     Discussed anticholinergics for his overactive bladder symptoms however given the potential side effects and the low degree of bother he prefers to avoid them.    Plan  PSA today  Continue Flomax 0.4mg daily  Recommended TRUS biopsy of prostate   Gentamicin 120mg IM prior to the biopsy.   Ciprofloxacin 500mg po bid for 3 days to start the day prior to biopsy.   Patient denies taking antiplatelets or anticoagulant medication.   Xanax 1mg    -will have a

## 2018-07-24 NOTE — PROGRESS NOTES
Patient Information     Patient Name  Malik Israel III MRN  [id number] Sex  Male   1955      Letter by Linus Obando MD at      Author:  Linus Obando MD Service:  (none) Author Type:  (none)    Filed:   Encounter Date:  2017 Status:  (Other)           Malissa Castillo CNP  [facility]  [address] [facility] [place] [address]          2017    Re:  Malik Israel III       : 1955  [address] [facility] [address]    Appointment Date: 17      Dear  Malissa,    Thank you for allowing me to take part in this patient s care.  I copied my note below from today's clinic visit for your records.  Please feel free to contact me with any questions      Warm regards,            Linus Obando MD, PharmD, [facility]  Urologist  [address] [place] [address]  Appointments: [phone]                      PCP:   SHARON Cabrera    Type of Visit  EST    Chief Complaint  Elevated PSA    HPI  Mr. Lindy ELENA is a 61 y.o. male who follows up with an elevated PSA.  He does not have a family history of prostate cancer.  The patient has not previously undergone prostate biopsy.  No associated worsening LUTS, dysuria or prostatitis at the time of the PSA.  The most recent PSA was collected yesterday.  I initially saw the patient 9 months ago with an elevated PSA.  We had discussed prostate biopsy however we did do a recheck same day of clinic visit and I returned lower so we elected to defer and follow-up PSA.      Family History  No family history of urologic cancers    Review of Systems  I personally reviewed the ROS with the patient.    Nursing Notes:   Magdalena Joyner, RN  2017  8:20 AM  Signed  Review of Systems:    Weight loss:    No     Recent fever/chills:  No   Night sweats:   No  Current skin rash:  No   Recent hair loss:  No  Heat intolerance:  No   Cold intolerance:  No  Chest pain:   No   Palpitations:   No  Shortness of  "breath:  No   Wheezing:   No  Constipation:    No   Diarrhea:   No   Nausea:   No   Vomiting:   No   Kidney/side pain:  No   Back pain:   No  Frequent headaches:  No   Dizziness:     No  Leg swelling:   No   Calf pain:    No      Physical Exam  Vitals:     02/20/17 1111   BP: 122/76   Resp: 12   Weight: 99.6 kg (219 lb 9.6 oz)   Height: 1.765 m (5' 9.5\")   Constitutional: No acute distress.  Alert and cooperative   Head: NCAT  Eyes: Conjunctivae normal  Cardiovascular: Regular rate.  Pulmonary/Chest: Respirations are even and non-labored bilaterally, no audible wheezing  Abdominal: Soft. No distension, tenderness, masses or guarding.   Neurological: A + O x 3.  Cranial Nerves II-XII grossly intact.  Extremities: CARMEN x 4, Warm. No clubbing.  No cyanosis.    Skin: Pink, warm and dry.  No visible rashes noted.  Psychiatric:  Normal mood and affect  Back:  No left CVA tenderness.  No right CVA tenderness.  Genitourinary:  Nonpalpable bladder  LLUVIA today revealed a 30 g prostate, no nodules, no asymmetry, no tenderness    Labs  PSA 4.26 1/16/2017    Results for ERIC JOSHI III (MRN [id number]) as of 11/29/2017 08:14   2/20/2017 11:54 11/28/2017 08:35   PSA TOTAL (DIAGNOSTIC) 3.000 3.398 (H)       Assessment  Mr. Lindy ELENA is a 62 y.o. male who follows up with elevated but stable PSA.  Normal LLUVIA.  Discussed the indications for prostate biopsy.  Also discussed the alternative of close PSA surveillance.    We discussed the following percentages:    5% Risk of high grade cancer detected on biopsy  17% Risk of low grade cancer detected on biopsy  78% Chance of benign biopsy    Plan  Follow up with me in 1 year with PSA and PVR  Continue Flomax 0.4mg daily        "

## 2018-08-30 ENCOUNTER — TELEPHONE (OUTPATIENT)
Dept: OPHTHALMOLOGY | Facility: CLINIC | Age: 63
End: 2018-08-30

## 2018-09-13 ENCOUNTER — TELEPHONE (OUTPATIENT)
Dept: OPHTHALMOLOGY | Facility: CLINIC | Age: 63
End: 2018-09-13

## 2018-09-14 ENCOUNTER — TELEPHONE (OUTPATIENT)
Dept: OPHTHALMOLOGY | Facility: CLINIC | Age: 63
End: 2018-09-14

## 2018-11-23 ENCOUNTER — TELEPHONE (OUTPATIENT)
Dept: LAB | Facility: OTHER | Age: 63
End: 2018-11-23

## 2018-11-23 DIAGNOSIS — R97.20 ELEVATED PSA: Primary | ICD-10-CM

## 2018-11-26 NOTE — TELEPHONE ENCOUNTER
"Pt.'s last office visit with Linus Obando MD was on 11/29/17 and note states:  \"Plan  Follow up with me in 1 year with PSA and PVR  Continue Flomax 0.4mg daily\"  To MD to sign PSA order.  Magdalena Joyner RN.........11/26/2018...7:23 AM   "

## 2018-12-04 DIAGNOSIS — R97.20 ELEVATED PSA: ICD-10-CM

## 2018-12-04 LAB — PSA SERPL-MCNC: 3.95 NG/ML

## 2018-12-04 PROCEDURE — 84153 ASSAY OF PSA TOTAL: CPT | Performed by: UROLOGY

## 2018-12-05 ENCOUNTER — OFFICE VISIT (OUTPATIENT)
Dept: UROLOGY | Facility: OTHER | Age: 63
End: 2018-12-05
Attending: UROLOGY
Payer: COMMERCIAL

## 2018-12-05 VITALS
BODY MASS INDEX: 31.72 KG/M2 | SYSTOLIC BLOOD PRESSURE: 124 MMHG | RESPIRATION RATE: 14 BRPM | DIASTOLIC BLOOD PRESSURE: 84 MMHG | WEIGHT: 214.8 LBS

## 2018-12-05 DIAGNOSIS — R97.20 ELEVATED PSA: Primary | ICD-10-CM

## 2018-12-05 PROCEDURE — 99213 OFFICE O/P EST LOW 20 MIN: CPT | Performed by: UROLOGY

## 2018-12-05 ASSESSMENT — PAIN SCALES - GENERAL: PAINLEVEL: NO PAIN (1)

## 2018-12-05 NOTE — PROGRESS NOTES
PCP:   SHARON Cabrera    Type of Visit  EST    Chief Complaint  Elevated PSA    HPI  Mr. Lindy ELENA is a 63 y.o. male who follows up with an elevated PSA.  He does not have a family history of prostate cancer.  The patient has not previously undergone prostate biopsy.  No associated worsening LUTS, dysuria or prostatitis at the time of the PSA.  The most recent PSA was collected yesterday.  I last saw the patient 1 year ago and he reports no changes in urinary symptoms in the meantime.  He denies unintentional weight loss, pelvic pain or bone pain, gross hematuria.      Family History  No family history of urologic cancers    Review of Systems  I personally reviewed the ROS with the patient.    Nursing Notes:   Alicia Jewell LPN  12/5/2018  9:30 AM  Signed  Pt presents to clinic for a one year follow up on elevated PSA    Review of Systems:    Weight loss:    No     Recent fever/chills:  No   Night sweats:   No  Current skin rash:  No   Recent hair loss:  No  Heat intolerance:  No   Cold intolerance:  No  Chest pain:   No   Palpitations:   No  Shortness of breath:  No   Wheezing:   No  Constipation:    No   Diarrhea:   No   Nausea:   No   Vomiting:   No   Kidney/side pain:  No   Back pain:   No  Frequent headaches:  No   Dizziness:     No  Leg swelling:   No   Calf pain:    No    Post-Void Residual  A post-void residual was measured by ultrasonic bladder scanner.  195 mL      Physical Exam  /84 (BP Location: Right arm, Patient Position: Sitting, Cuff Size: Adult Regular)  Resp 14  Wt 97.4 kg (214 lb 12.8 oz)  BMI 31.72 kg/m2  Constitutional: No acute distress.  Alert and cooperative   Head: NCAT  Eyes: Conjunctivae normal  Cardiovascular: Regular rate.  Pulmonary/Chest: Respirations are even and non-labored bilaterally, no audible wheezing  Abdominal: Soft. No distension, tenderness, masses or guarding.   Neurological: A + O x 3.  Cranial Nerves II-XII grossly intact.  Extremities: CARMEN x 4, Warm. No  clubbing.  No cyanosis.    Skin: Pink, warm and dry.  No visible rashes noted.  Psychiatric:  Normal mood and affect  Back:  No left CVA tenderness.  No right CVA tenderness.  Genitourinary:  Nonpalpable bladder  LLUVIA today revealed a 30 g prostate, no nodules, no asymmetry, no tenderness    Labs  Results for ERIC JOSHI (MRN [id number]) as of 12/5/2018 09:31   12/4/2018 08:58   Prostate Specific Antigen 3.951 (H)     PSA 4.26 1/16/2017    Results for ERIC JOSHI III (MRN [id number]) as of 11/29/2017 08:14   2/20/2017 11:54 11/28/2017 08:35   PSA TOTAL (DIAGNOSTIC) 3.000 3.398 (H)     Post-Void Residual  A post-void residual was measured by ultrasonic bladder scanner.  195 mL (voided 2.5 hours ago)    Assessment  Mr. Lindy ELENA is a 63 y.o. male who follows up with persistently and mildly elevated but relatively stable PSA.  Normal LLUVIA.  Discussed the indications for prostate biopsy.  Also discussed the alternative of close PSA surveillance.    We discussed options of proceeding to biopsy, prostate MRI, urine testing, serial PSAs.  After a long discussion involving the risks and benefits and alternatives we decided to proceed with serial PSA testing.    Plan  Follow up with me in 1 year with PSA and PVR  Continue Flomax 0.4mg daily

## 2018-12-05 NOTE — NURSING NOTE
Pt presents to clinic for a one year follow up on elevated PSA    Review of Systems:    Weight loss:    No     Recent fever/chills:  No   Night sweats:   No  Current skin rash:  No   Recent hair loss:  No  Heat intolerance:  No   Cold intolerance:  No  Chest pain:   No   Palpitations:   No  Shortness of breath:  No   Wheezing:   No  Constipation:    No   Diarrhea:   No   Nausea:   No   Vomiting:   No   Kidney/side pain:  No   Back pain:   No  Frequent headaches:  No   Dizziness:     No  Leg swelling:   No   Calf pain:    No    Post-Void Residual  A post-void residual was measured by ultrasonic bladder scanner.  195 mL

## 2018-12-05 NOTE — MR AVS SNAPSHOT
After Visit Summary   12/5/2018    Malik Israel    MRN: [id number]           Patient Information     Date Of Birth          1955        Visit Information        Provider Department      12/5/2018 9:00 AM Linus Obando MD [facility]         Follow-ups after your visit        Your next 10 appointments already scheduled     Feb 25, 2019  7:45 AM CST   (Arrive by 6:45 AM)   MR ORBIT W/O & W CONTRAST with [id number]   [facility] [place] [facility] ([facility])    [address] [place] [id number] [phone]           How do I prepare for my exam? (Food and drink instructions) **If you will be receiving sedation or general anesthesia, please see special notes below.**  How do I prepare for my exam? (Other instructions) Take your medicines as usual, unless your doctor tells you not to. You may or may not receive intravenous (IV) contrast for this exam pending the discretion of the Radiologist.  You do not need to do anything special to prepare.  **If you will be receiving sedation or general anesthesia, please see special notes below.**  What should I wear: The MRI machine uses a strong magnet. Please wear clothes without metal (snaps, zippers). A sweatsuit works well, or we may give you a hospital gown. Please remove any body piercings and hair extensions before you arrive. You will also remove watches, jewelry, hairpins, wallets, dentures, partial dental plates and hearing aids. You may wear contact lenses, and you may be able to wear your rings. We have a safe place to keep your personal items, but it is safer to leave them at home.  How long does the exam take: Most tests take 30 to 60 minutes.  HOWEVER, IF YOUR DOCTOR PRESCRIBES ANESTHESIA please plan on spending four to five hours in the recovery room.  What should I bring:  Bring a list of your current medicines to your exam (including vitamins, minerals and over-the-counter  drugs).  Do I need a :  **If you will be receiving sedation or general anesthesia, please see special notes below.**  What should I do after the exam: No Restrictions, You may resume normal activities.  What is this test: MRI (magnetic resonance imaging) uses a strong magnet and radio waves to look inside the body. An MRA (magnetic resonance angiogram) does the same thing, but it lets us look at your blood vessels. A computer turns the radio waves into pictures showing cross sections of the body, much like slices of bread. This helps us see any problems more clearly. You may receive fluid (called  contrast ) before or during your scan. The fluid helps us see the pictures better. We give the fluid through an IV (small needle in your arm).  Who should I call with questions:  Please call the Imaging Department at your exam site with any questions. Directions, parking instructions, and other information is available on our website, [employer].  How do I prepare if I m having sedation or anesthesia? **IMPORTANT** THE INSTRUCTIONS BELOW ARE ONLY FOR THOSE PATIENTS WHO HAVE BEEN TOLD THEY WILL RECEIVE SEDATION OR GENERAL ANESTHESIA DURING THEIR MRI PROCEDURE:  IF YOU WILL RECEIVE SEDATION (take medicine to help you relax during your exam): You must get the medicine from your doctor before you arrive. Bring the medicine to the exam. Do not take it at home. Arrive one hour early. Bring someone who can take you home after the test. Your medicine will make you sleepy. After the exam, you may not drive, take a bus or take a taxi by yourself. No eating 8 hours before your exam. You may have clear liquids up until 4 hours before your exam. (Clear liquids include water, clear tea, black coffee and fruit juice without pulp.)  IF YOU WILL RECEIVE ANESTHESIA (be asleep for your exam): Arrive 1 1/2 hours early. Bring someone who can take you home after the test. You may not drive, take a bus or take a taxi by yourself.  No eating 8 hours before your exam. You may have clear liquids up until 4 hours before your exam. (Clear liquids include water, clear tea, black coffee and fruit juice without pulp.)            Feb 25, 2019  8:45 AM CST   (Arrive by 8:30 AM)   RETURN PLASTICS with Sander Linder MD   [facility] ([facility] [place])    [address]  [address] [address] [phone]              Who to contact     If you have questions or need follow up information about today's clinic visit or your schedule please contact [facility] directly at [phone].  Normal or non-critical lab and imaging results will be communicated to you by [employer], letter or phone within 4 business days after the clinic has received the results. If you do not hear from us within 7 days, please contact the clinic through w[employer] or phone. If you have a critical or abnormal lab result, we will notify you by phone as soon as possible.  Submit refill requests through [employer] or call your pharmacy and they will forward the refill request to us. Please allow 3 business days for your refill to be completed.          Additional Information About Your Visit        [employer] Information     [employer] gives you secure access to your electronic health record. If you see a primary care provider, you can also send messages to your care team and make appointments. If you have questions, please call your primary care clinic.  If you do not have a primary care provider, please call [phone] and they will assist you.        Care EveryWhere ID     This is your Care EveryWhere ID. This could be used by other organizations to access your [place] medical records  [id number]        Your Vitals Were     Respirations BMI (Body Mass Index)                14 31.72 kg/m2           Blood Pressure from Last 3 Encounters:   12/05/18 124/84   11/29/17 110/60   02/20/17 122/76    Weight from Last 3  Encounters:   12/05/18 97.4 kg (214 lb 12.8 oz)   11/29/17 98.8 kg (217 lb 12.8 oz)   02/20/17 99.6 kg (219 lb 9.6 oz)              Today, you had the following     No orders found for display       Primary Care Provider Office Phone # Fax #    Anabella Ro [phone]       [facility] [employer]   [facility] [address]        Equal Access to Services     MCKENZIE ELENA : Hadii aad ku hadasho Soomaali, waaxda luqadaha, qaybta kaalmada adeegyada, waxay idiin hayaan adeeg kharash lasonny saul. So [facility] [phone].    ATENCIÓN: Si habla español, tiene a sterling disposición servicios gratuitos de asistencia lingüística. [facility] [phone].    We comply with applicable federal civil rights laws and [place] laws. We do not discriminate on the basis of race, color, national origin, age, disability, sex, sexual orientation, or gender identity.            Thank you!     Thank you for choosing [facility]  for your care. Our goal is always to provide you with excellent care. Hearing back from our patients is one way we can continue to improve our services. Please take a few minutes to complete the written survey that you may receive in the mail after your visit with us. Thank you!             Your Updated Medication List - Protect others around you: Learn how to safely use, store and throw away your medicines at www.disposemymeds.org.          This list is accurate as of 12/5/18 10:05 AM.  Always use your most recent med list.                   Brand Name Dispense Instructions for use Diagnosis    aspirin 81 MG tablet    ASA     Take by mouth daily        CENTRUM Tabs           CITRUCEL PO           LANsoprazole 30 MG DR capsule    PREVACID     Take 1 capsule by mouth daily        lisinopril-hydrochlorothiazide 20-25 MG tablet    PRINZIDE/ZESTORETIC          sildenafil 50 MG tablet    VIAGRA     Take 50 mg by mouth daily as needed for erectile dysfunction Take 30 min to 4  hours before sexual activity. Max 100 mg in 24 hrs        tamsulosin 0.4 MG capsule    FLOMAX     TAKE 1 CAPSULE BY MOUTH EVERY DAY. SWALLOW WHOLE. DO NOT CRUSH, CHEW, OR OPEN        zolpidem 5 MG tablet    AMBIEN     TAKE 1-2 TABLETS BY MOUTH AT BEDTIME

## 2019-02-04 ENCOUNTER — DOCUMENTATION ONLY (OUTPATIENT)
Dept: CARE COORDINATION | Facility: CLINIC | Age: 64
End: 2019-02-04

## 2019-02-20 DIAGNOSIS — F41.9 ANXIETY: Primary | ICD-10-CM

## 2019-02-20 RX ORDER — DIAZEPAM 5 MG
5 TABLET ORAL ONCE
Qty: 1 TABLET | Refills: 0 | Status: SHIPPED | OUTPATIENT
Start: 2019-02-20 | End: 2019-02-20

## 2019-02-25 ENCOUNTER — ANCILLARY PROCEDURE (OUTPATIENT)
Dept: MRI IMAGING | Facility: CLINIC | Age: 64
End: 2019-02-25
Attending: OPHTHALMOLOGY
Payer: COMMERCIAL

## 2019-02-25 ENCOUNTER — OFFICE VISIT (OUTPATIENT)
Dept: OPHTHALMOLOGY | Facility: CLINIC | Age: 64
End: 2019-02-25
Payer: COMMERCIAL

## 2019-02-25 DIAGNOSIS — H05.89 ORBITAL MASS: Primary | ICD-10-CM

## 2019-02-25 DIAGNOSIS — J32.2 CHRONIC ETHMOIDAL SINUSITIS: ICD-10-CM

## 2019-02-25 DIAGNOSIS — H05.89 ORBITAL MASS: ICD-10-CM

## 2019-02-25 DIAGNOSIS — J34.1 MUCOUS RETENTION CYST OF MAXILLARY SINUS: ICD-10-CM

## 2019-02-25 RX ORDER — GADOBUTROL 604.72 MG/ML
10 INJECTION INTRAVENOUS ONCE
Status: COMPLETED | OUTPATIENT
Start: 2019-02-25 | End: 2019-02-25

## 2019-02-25 RX ADMIN — GADOBUTROL 10 ML: 604.72 INJECTION INTRAVENOUS at 07:13

## 2019-02-25 ASSESSMENT — TONOMETRY
IOP_METHOD: ICARE
OD_IOP_MMHG: 12
OS_IOP_MMHG: 13

## 2019-02-25 ASSESSMENT — VISUAL ACUITY
OS_SC: 20/20
OD_SC: 20/20
METHOD: SNELLEN - LINEAR

## 2019-02-25 ASSESSMENT — CONF VISUAL FIELD
OD_NORMAL: 1
OS_NORMAL: 1

## 2019-02-25 ASSESSMENT — EXTERNAL EXAM - RIGHT EYE: OD_EXAM: NORMAL

## 2019-02-25 ASSESSMENT — CUP TO DISC RATIO
OD_RATIO: 0.3
OS_RATIO: 0.3

## 2019-02-25 ASSESSMENT — SLIT LAMP EXAM - LIDS
COMMENTS: NORMAL
COMMENTS: NORMAL

## 2019-02-25 ASSESSMENT — PATIENT HEALTH QUESTIONNAIRE - PHQ9
10. IF YOU CHECKED OFF ANY PROBLEMS, HOW DIFFICULT HAVE THESE PROBLEMS MADE IT FOR YOU TO DO YOUR WORK, TAKE CARE OF THINGS AT HOME, OR GET ALONG WITH OTHER PEOPLE: NOT DIFFICULT AT ALL
SUM OF ALL RESPONSES TO PHQ QUESTIONS 1-9: 1
SUM OF ALL RESPONSES TO PHQ QUESTIONS 1-9: 1

## 2019-02-25 ASSESSMENT — EXTERNAL EXAM - LEFT EYE: OS_EXAM: NORMAL

## 2019-02-25 NOTE — DISCHARGE INSTRUCTIONS
MRI Contrast Discharge Instructions    The IV contrast you received today will pass out of your body in your  urine. This will happen in the next 24 hours. You will not feel this process.  Your urine will not change color.    Drink at least 4 extra glasses of water or juice today (unless your doctor  has restricted your fluids). This reduces the stress on your kidneys.  You may take your regular medicines.    If you are on dialysis: It is best to have dialysis today.    If you have a reaction: Most reactions happen right away. If you have  any new symptoms after leaving the hospital (such as hives or swelling),  call your hospital at the correct number below. Or call your family doctor.  If you have breathing distress or wheezing, call 911.    Special instructions: ***    I have read and understand the above information.    Signature:______________________________________ Date:___________    Staff:__________________________________________ Date:___________     Time:__________    [place] [facility] Departments:    ___[place]: [phone]  ___[facility]: [phone]  ___[place]: [phone] ___[facility]: [phone]  ___[facility]: [phone]  ___[facility]: [phone]  ___[phone]  ___[facility]: [phone]  ___[phone]

## 2019-02-25 NOTE — PROGRESS NOTES
Chief Complaints and History of Present Illnesses   Patient presents with     Follow Up For     Orbital mass     Malik Israel is a 63 year old male who has history of an orbital mass found incidentally. Reports vision has been stable, no new headaches. No changes in vision. Does report a fall from a ladder 5 months ago w/o LOC. Denies associated neuro or vision changes after fall.        Assessment & Plan     Malik Israel is a 63 year old male with the following diagnoses:   1. Orbital mass - Left Eye    2. Chronic ethmoidal sinusitis - Left Eye    3. 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  Visual fields stable and full both eyes.   MRI shows ethmoid and maxillary sinus changes. Orbital mass appears stable to smaller  G-top remains normal today    Return to clinic 1 year - exam, OCT, GTOP  and repeat imaging in 2 year.    Constantino Dennis MD  Attending Physician Attestation:  Complete documentation of historical and exam elements from today's encounter can be found in the full encounter summary report (not reduplicated in this progress note).  I personally obtained the chief complaint(s) and history of present illness.  I confirmed and edited as necessary the review of systems, past medical/surgical history, family history, social history, and examination findings as documented by others; and I examined the patient myself.  I personally reviewed the relevant tests, images, and reports as documented above.  I formulated and edited as necessary the assessment and plan and discussed the findings and management plan with the patient and family. I personally reviewed the ophthalmic test(s) associated with this encounter, agree with the interpretation(s) as documented by the resident/fellow, and have edited the corresponding report(s) as necessary.   -Sander Linder MD

## 2019-02-25 NOTE — NURSING NOTE
Chief Complaints and History of Present Illnesses   Patient presents with     Follow Up     1 year f/u Orbital mass, chronic ethmoidal sinusitis     Chief Complaint(s) and History of Present Illness(es)     Follow Up     Laterality: both eyes    Associated symptoms: Negative for eye pain    Comments: 1 year f/u Orbital mass, chronic ethmoidal sinusitis              Comments     Patient notes no changes in vision since last visit, last year, denies pain, diplopia, or blurred VA    Nitza Maldonado February 25, 2019 8:36 AM

## 2019-02-26 ASSESSMENT — PATIENT HEALTH QUESTIONNAIRE - PHQ9: SUM OF ALL RESPONSES TO PHQ QUESTIONS 1-9: 1

## 2019-11-08 ENCOUNTER — HEALTH MAINTENANCE LETTER (OUTPATIENT)
Age: 64
End: 2019-11-08

## 2019-11-13 DIAGNOSIS — R97.20 ELEVATED PSA: Primary | ICD-10-CM

## 2019-11-21 ENCOUNTER — OFFICE VISIT (OUTPATIENT)
Dept: UROLOGY | Facility: OTHER | Age: 64
End: 2019-11-21
Attending: UROLOGY
Payer: MEDICARE

## 2019-11-21 VITALS
RESPIRATION RATE: 14 BRPM | HEART RATE: 68 BPM | WEIGHT: 212.8 LBS | SYSTOLIC BLOOD PRESSURE: 128 MMHG | BODY MASS INDEX: 31.43 KG/M2 | DIASTOLIC BLOOD PRESSURE: 82 MMHG

## 2019-11-21 DIAGNOSIS — R97.20 ELEVATED PSA: Primary | ICD-10-CM

## 2019-11-21 DIAGNOSIS — R35.0 BENIGN PROSTATIC HYPERPLASIA WITH URINARY FREQUENCY: ICD-10-CM

## 2019-11-21 DIAGNOSIS — R97.20 ELEVATED PSA: ICD-10-CM

## 2019-11-21 DIAGNOSIS — N40.1 BENIGN PROSTATIC HYPERPLASIA WITH URINARY FREQUENCY: ICD-10-CM

## 2019-11-21 LAB — PSA SERPL-MCNC: 4.64 NG/ML

## 2019-11-21 PROCEDURE — 51798 US URINE CAPACITY MEASURE: CPT | Performed by: UROLOGY

## 2019-11-21 PROCEDURE — G0463 HOSPITAL OUTPT CLINIC VISIT: HCPCS | Mod: 25

## 2019-11-21 PROCEDURE — 99213 OFFICE O/P EST LOW 20 MIN: CPT | Performed by: UROLOGY

## 2019-11-21 PROCEDURE — 36415 COLL VENOUS BLD VENIPUNCTURE: CPT | Mod: ZL | Performed by: UROLOGY

## 2019-11-21 PROCEDURE — 84153 ASSAY OF PSA TOTAL: CPT | Mod: ZL | Performed by: UROLOGY

## 2019-11-21 RX ORDER — TAMSULOSIN HYDROCHLORIDE 0.4 MG/1
CAPSULE ORAL
Qty: 90 CAPSULE | Refills: 3 | Status: SHIPPED | OUTPATIENT
Start: 2019-11-21 | End: 2020-11-02

## 2019-11-21 RX ORDER — TAMSULOSIN HYDROCHLORIDE 0.4 MG/1
CAPSULE ORAL
Qty: 90 CAPSULE | Refills: 3 | Status: SHIPPED | OUTPATIENT
Start: 2019-11-21 | End: 2019-11-21

## 2019-11-21 RX ORDER — SILDENAFIL 100 MG/1
TABLET, FILM COATED ORAL
Qty: 18 TABLET | Refills: 3 | Status: SHIPPED | OUTPATIENT
Start: 2019-11-21 | End: 2020-11-02

## 2019-11-21 RX ORDER — AMLODIPINE BESYLATE AND ATORVASTATIN CALCIUM 5; 10 MG/1; MG/1
1 TABLET, FILM COATED ORAL DAILY
COMMUNITY

## 2019-11-21 ASSESSMENT — PAIN SCALES - GENERAL: PAINLEVEL: NO PAIN (0)

## 2019-11-21 NOTE — PROGRESS NOTES
PCP:   SHARON Cabrera    Type of Visit  EST    Chief Complaint  Elevated PSA    HPI  Mr. Lindy ELENA is a 64 y.o. male who follows up with an elevated PSA.  He does not have a family history of prostate cancer.  The patient has not previously undergone prostate biopsy.  Patient underwent PSA testing earlier today.  He reports no changes in the last year.  Specifically he denies bone or pelvic pain, gross hematuria and unintentional weight loss.      Family History  No family history of urologic cancers    Review of Systems  I personally reviewed the ROS with the patient.    Nursing Notes:   Alicia Jewell LPN  11/21/2019 10:25 AM  Signed  Pt presents to clinic for yearly medication follow up    Review of Systems:    Weight loss:    No     Recent fever/chills:  No   Night sweats:   No  Current skin rash:  No   Recent hair loss:  No  Heat intolerance:  No   Cold intolerance:  No  Chest pain:   No   Palpitations:   No  Shortness of breath:  No   Wheezing:   No  Constipation:    No   Diarrhea:   No   Nausea:   No   Vomiting:   No   Kidney/side pain:  No   Back pain:   No  Frequent headaches:  No   Dizziness:     No  Leg swelling:   No   Calf pain:    No    Post-Void Residual  A post-void residual was measured by ultrasonic bladder scanner.  65 mL      Physical Exam  /82 (BP Location: Left arm, Patient Position: Sitting, Cuff Size: Adult Regular)   Pulse 68   Resp 14   Wt 96.5 kg (212 lb 12.8 oz)   BMI 31.43 kg/m    Constitutional: No acute distress.  Alert and cooperative   Head: NCAT  Eyes: Conjunctivae normal  Cardiovascular: Regular rate.  Pulmonary/Chest: Respirations are even and non-labored bilaterally, no audible wheezing  Abdominal: Soft. No distension, tenderness, masses or guarding.   Neurological: A + O x 3.  Cranial Nerves II-XII grossly intact.  Extremities: CARMEN x 4, Warm. No clubbing.  No cyanosis.    Skin: Pink, warm and dry.  No visible rashes noted.  Psychiatric:  Normal mood and  affect  Back:  No left CVA tenderness.  No right CVA tenderness.  Genitourinary:  Nonpalpable bladder  LLUVIA today revealed a 30 g prostate, no nodules, no asymmetry, no tenderness    Labs  Results for AAMIR JOSHI (MRN [id number]) as of 11/21/2019 10:25   11/28/2017 09:18 12/4/2018 08:58 11/21/2019 07:34   Prostate Specific Antigen 3.398 (H) 3.951 (H) 4.636 (H)     PSA 4.26 1/16/2017    Results for ERIC JOSHI III (MRN [id number]) as of 11/29/2017 08:14   2/20/2017 11:54 11/28/2017 08:35   PSA TOTAL 3.000 3.398 (H)     Post-Void Residual  A post-void residual was measured by ultrasonic bladder scanner.  65 mL today  195 mL (voided 2.5 hours ago)    Assessment  Mr. Lindy ELENA is a 64 y.o. male who follows up with persistently and mildly elevated but relatively stable PSA.  Normal LLUVIA.  We again discussed the options of biopsy versus observation and we both agree we will continue to trend out the PSA given its stability over the last 2-3 years.    We again discussed options of proceeding to biopsy, prostate MRI, urine testing, serial PSAs.  After a long discussion involving the risks and benefits and alternatives we decided to proceed with serial PSA testing.    Plan  Follow up with me in 1 year with PSA  Continue Flomax 0.4mg daily

## 2019-11-21 NOTE — NURSING NOTE
Pt presents to clinic for yearly medication follow up    Review of Systems:    Weight loss:    No     Recent fever/chills:  No   Night sweats:   No  Current skin rash:  No   Recent hair loss:  No  Heat intolerance:  No   Cold intolerance:  No  Chest pain:   No   Palpitations:   No  Shortness of breath:  No   Wheezing:   No  Constipation:    No   Diarrhea:   No   Nausea:   No   Vomiting:   No   Kidney/side pain:  No   Back pain:   No  Frequent headaches:  No   Dizziness:     No  Leg swelling:   No   Calf pain:    No    Post-Void Residual  A post-void residual was measured by ultrasonic bladder scanner.  65 mL

## 2020-02-23 ENCOUNTER — HEALTH MAINTENANCE LETTER (OUTPATIENT)
Age: 65
End: 2020-02-23

## 2020-03-02 ENCOUNTER — OFFICE VISIT (OUTPATIENT)
Dept: OPHTHALMOLOGY | Facility: CLINIC | Age: 65
End: 2020-03-02
Payer: COMMERCIAL

## 2020-03-02 DIAGNOSIS — H05.89 ORBITAL MASS: ICD-10-CM

## 2020-03-02 DIAGNOSIS — H05.89 ORBITAL MASS: Primary | ICD-10-CM

## 2020-03-02 RX ORDER — DIAZEPAM 5 MG
5 TABLET ORAL EVERY 6 HOURS PRN
Qty: 1 TABLET | Refills: 0 | Status: SHIPPED | OUTPATIENT
Start: 2020-03-02

## 2020-03-02 ASSESSMENT — VISUAL ACUITY
OS_SC+: -2
OS_SC: 20/20
OD_SC+: -1
OD_SC: 20/20
METHOD: SNELLEN - LINEAR

## 2020-03-02 ASSESSMENT — SLIT LAMP EXAM - LIDS
COMMENTS: 2+ PTOSIS, DERMATOCHALASIS
COMMENTS: DERMATOCHALASIS

## 2020-03-02 ASSESSMENT — MARGIN REFLEX DISTANCE
OS_MRD1: 3
OD_MRD1: 2

## 2020-03-02 ASSESSMENT — TONOMETRY
IOP_METHOD: ICARE
OS_IOP_MMHG: 8
OD_IOP_MMHG: 10

## 2020-03-02 ASSESSMENT — CUP TO DISC RATIO
OS_RATIO: 0.2
OD_RATIO: 0.2

## 2020-03-02 ASSESSMENT — CONF VISUAL FIELD
METHOD: COUNTING FINGERS
OS_NORMAL: 1
OD_NORMAL: 1

## 2020-03-02 ASSESSMENT — EXTERNAL EXAM - RIGHT EYE: OD_EXAM: NORMAL

## 2020-03-02 ASSESSMENT — EXTERNAL EXAM - LEFT EYE: OS_EXAM: NORMAL

## 2020-03-02 NOTE — NURSING NOTE
Chief Complaints and History of Present Illnesses   Patient presents with     Follow Up     Chief Complaint(s) and History of Present Illness(es)     Follow Up     Laterality: both eyes    Onset: years ago    Frequency: constantly    Course: stable    Associated symptoms: Negative for double vision    Treatments tried: no treatments    Pain scale: 0/10              Comments     Follow up for orbital mass. Last MRI 2/2019. Pt states vision seems stable. No double vision. No pain. No drops.    Sai Osorio, COT COT 7:34 AM March 2, 2020

## 2020-03-02 NOTE — PROGRESS NOTES
Chief Complaints and History of Present Illnesses   Patient presents with     Follow Up     Chief Complaint(s) and History of Present Illness(es)     Follow Up     In both eyes.  This started years ago.  Occurring constantly.  Since   onset it is stable.  Associated symptoms include Negative for double   vision.  Treatments tried include no treatments.  Pain was noted as 0/10.              Comments     Follow up for orbital mass. Last MRI 2/2019. Pt states vision seems   stable. No double vision. No pain. No drops.    Sai Osorio, SHAILESH COT 7:34 AM March 2, 2020                      Assessment & Plan     Malik Israel is a 64 year old male with the following diagnoses:   1. Orbital mass - Left Eye         OCT and GTOP normal today    PLAN:  Observe   Return to clinic 1 year with MRI            Attending Physician Attestation:  Complete documentation of historical and exam elements from today's encounter can be found in the full encounter summary report (not reduplicated in this progress note).  I personally obtained the chief complaint(s) and history of present illness.  I confirmed and edited as necessary the review of systems, past medical/surgical history, family history, social history, and examination findings as documented by others; and I examined the patient myself.  I personally reviewed the relevant tests, images, and reports as documented above.  I formulated and edited as necessary the assessment and plan and discussed the findings and management plan with the patient and family. I personally reviewed the ophthalmic test(s) associated with this encounter, agree with the interpretation(s) as documented and have edited the corresponding report(s) as necessary.   -Sander Linder MD  7:58 AM 3/2/2020

## 2020-10-14 DIAGNOSIS — R97.20 ELEVATED PSA: ICD-10-CM

## 2020-10-14 LAB — PSA SERPL-MCNC: 4.4 NG/ML

## 2020-10-14 PROCEDURE — 84153 ASSAY OF PSA TOTAL: CPT | Mod: ZL | Performed by: UROLOGY

## 2020-10-14 PROCEDURE — 36415 COLL VENOUS BLD VENIPUNCTURE: CPT | Mod: ZL | Performed by: UROLOGY

## 2020-11-02 ENCOUNTER — OFFICE VISIT (OUTPATIENT)
Dept: UROLOGY | Facility: OTHER | Age: 65
End: 2020-11-02
Attending: UROLOGY
Payer: COMMERCIAL

## 2020-11-02 VITALS
SYSTOLIC BLOOD PRESSURE: 126 MMHG | WEIGHT: 204 LBS | BODY MASS INDEX: 30.13 KG/M2 | RESPIRATION RATE: 14 BRPM | HEART RATE: 67 BPM | DIASTOLIC BLOOD PRESSURE: 82 MMHG

## 2020-11-02 DIAGNOSIS — N52.9 ED (ERECTILE DYSFUNCTION) OF ORGANIC ORIGIN: ICD-10-CM

## 2020-11-02 DIAGNOSIS — R97.20 ELEVATED PSA: Primary | ICD-10-CM

## 2020-11-02 DIAGNOSIS — R35.0 BENIGN PROSTATIC HYPERPLASIA WITH URINARY FREQUENCY: ICD-10-CM

## 2020-11-02 DIAGNOSIS — N40.1 BENIGN PROSTATIC HYPERPLASIA WITH URINARY FREQUENCY: ICD-10-CM

## 2020-11-02 PROCEDURE — 99213 OFFICE O/P EST LOW 20 MIN: CPT | Performed by: UROLOGY

## 2020-11-02 PROCEDURE — G0463 HOSPITAL OUTPT CLINIC VISIT: HCPCS

## 2020-11-02 RX ORDER — TAMSULOSIN HYDROCHLORIDE 0.4 MG/1
CAPSULE ORAL
Qty: 90 CAPSULE | Refills: 3 | Status: SHIPPED | OUTPATIENT
Start: 2020-11-02 | End: 2021-11-01

## 2020-11-02 RX ORDER — SILDENAFIL 100 MG/1
TABLET, FILM COATED ORAL
Qty: 6 TABLET | Refills: 11 | Status: SHIPPED | OUTPATIENT
Start: 2020-11-02

## 2020-11-02 ASSESSMENT — PAIN SCALES - GENERAL: PAINLEVEL: NO PAIN (0)

## 2020-11-02 NOTE — PROGRESS NOTES
PCP:   SHARON Cabrera    Type of Visit  EST    Chief Complaint  Elevated PSA  ED    HPI  Mr. Lindy ELENA is a 65 y.o. male who follows up with an elevated PSA and ED.  He does not have a family history of prostate cancer.  The patient has not previously undergone prostate biopsy.  Patient underwent PSA about 3 weeks ago.  He reports no changes in the last year.  Specifically he denies bone or pelvic pain, gross hematuria and unintentional weight loss.    Patient also has ED.  Historically he has been prescribed Viagra but does not recall attempting this medication in the last year or so.  He does have some ongoing questions regarding ED management.  He has issues with both achieving and maintaining an erection.      Family History  No family history of urologic cancers    Review of Systems  I personally reviewed the ROS with the patient.    Nursing Notes:   Alicia Jewell LPN  11/2/2020  9:36 AM  Signed  Patient presents to clinic today for 1 year elevated PSA follow up    Review of Systems:    Weight loss:    No     Recent fever/chills:  No   Night sweats:   No  Current skin rash:  No   Recent hair loss:  No  Heat intolerance:  No   Cold intolerance:  No  Chest pain:   No   Palpitations:   No  Shortness of breath:  No   Wheezing:   No  Constipation:    No   Diarrhea:   No   Nausea:   No   Vomiting:   No   Kidney/side pain:  No   Back pain:   No  Frequent headaches:  No   Dizziness:     No  Leg swelling:   No   Calf pain:    No               Physical Exam  /82 (BP Location: Left arm, Patient Position: Sitting, Cuff Size: Adult Regular)   Pulse 67   Resp 14   Wt 92.5 kg (204 lb)   BMI 30.13 kg/m    Constitutional: No acute distress.  Alert and cooperative   Head: NCAT  Eyes: Conjunctivae normal  Cardiovascular: Regular rate.  Pulmonary/Chest: Respirations are even and non-labored bilaterally, no audible wheezing  Abdominal: Soft. No distension, tenderness, masses or guarding.   Neurological: A + O x  3.  Cranial Nerves II-XII grossly intact.  Extremities: CARMEN x 4, Warm. No clubbing.  No cyanosis.    Skin: Pink, warm and dry.  No visible rashes noted.  Psychiatric:  Normal mood and affect  Back:  No left CVA tenderness.  No right CVA tenderness.  Genitourinary:  Nonpalpable bladder  LLUVIA today revealed a 30 g prostate, no nodules, no asymmetry, no tenderness    Labs  Results for AAMIR JOSHI (MRN [id number]) as of 11/2/2020 09:06   11/28/2017 09:18 12/4/2018 08:58 11/21/2019 07:34 10/14/2020 12:40   PSA 3.398 (H) 3.951 (H) 4.636 (H) 4.404 (H)     PSA 4.26 1/16/2017    Results for ERIC JOSHI III (MRN [id number]) as of 11/29/2017 08:14   2/20/2017 11:54 11/28/2017 08:35   PSA 3.000 3.398 (H)     Post-Void Residual  A post-void residual was measured by ultrasonic bladder scanner.  65 mL (previously recorded)    Assessment  Mr. Lindy ELENA is a 65 y.o. male who follows up with persistently and mildly elevated but relatively stable PSA.  Normal LLUVIA.  We again discussed the options of biopsy versus observation and we both agree we will continue to trend out the PSA given its stability over the last 2-3 years.    We again discussed options of proceeding to biopsy, prostate MRI, urine testing, serial PSAs.  After a long discussion involving the risks and benefits and alternatives we decided to proceed with serial PSA testing.    Plan  Follow up in 1 year with PSA  Continue Flomax 0.4mg daily  Sildenafil 100mg Rx sent

## 2020-11-02 NOTE — NURSING NOTE
Patient presents to clinic today for 1 year elevated PSA follow up    Review of Systems:    Weight loss:    No     Recent fever/chills:  No   Night sweats:   No  Current skin rash:  No   Recent hair loss:  No  Heat intolerance:  No   Cold intolerance:  No  Chest pain:   No   Palpitations:   No  Shortness of breath:  No   Wheezing:   No  Constipation:    No   Diarrhea:   No   Nausea:   No   Vomiting:   No   Kidney/side pain:  No   Back pain:   No  Frequent headaches:  No   Dizziness:     No  Leg swelling:   No   Calf pain:    No

## 2020-12-06 ENCOUNTER — HEALTH MAINTENANCE LETTER (OUTPATIENT)
Age: 65
End: 2020-12-06

## 2021-02-25 DIAGNOSIS — H05.89 MASS OF ORBIT: Primary | ICD-10-CM

## 2021-02-26 ENCOUNTER — TELEPHONE (OUTPATIENT)
Dept: OPHTHALMOLOGY | Facility: CLINIC | Age: 66
End: 2021-02-26

## 2021-03-19 ENCOUNTER — HOSPITAL ENCOUNTER (OUTPATIENT)
Dept: MRI IMAGING | Facility: CLINIC | Age: 66
Discharge: HOME | End: 2021-03-19
Attending: OPHTHALMOLOGY | Admitting: OPHTHALMOLOGY
Payer: MEDICARE

## 2021-03-19 ENCOUNTER — OFFICE VISIT (OUTPATIENT)
Dept: OPHTHALMOLOGY | Facility: CLINIC | Age: 66
End: 2021-03-19
Payer: COMMERCIAL

## 2021-03-19 VITALS — BODY MASS INDEX: 28.63 KG/M2 | WEIGHT: 200 LBS | HEIGHT: 70 IN

## 2021-03-19 DIAGNOSIS — H53.40 VISUAL FIELD DEFECT: Primary | ICD-10-CM

## 2021-03-19 DIAGNOSIS — H05.89 MASS OF ORBIT: ICD-10-CM

## 2021-03-19 DIAGNOSIS — H05.89 ORBITAL MASS: ICD-10-CM

## 2021-03-19 PROCEDURE — 70543 MRI ORBT/FAC/NCK W/O &W/DYE: CPT

## 2021-03-19 PROCEDURE — 92133 CPTRZD OPH DX IMG PST SGM ON: CPT | Performed by: OPHTHALMOLOGY

## 2021-03-19 PROCEDURE — 70543 MRI ORBT/FAC/NCK W/O &W/DYE: CPT | Mod: 26 | Performed by: STUDENT IN AN ORGANIZED HEALTH CARE EDUCATION/TRAINING PROGRAM

## 2021-03-19 PROCEDURE — 255N000002 HC RX 255 OP 636: Performed by: OPHTHALMOLOGY

## 2021-03-19 PROCEDURE — 92014 COMPRE OPH EXAM EST PT 1/>: CPT | Performed by: OPHTHALMOLOGY

## 2021-03-19 PROCEDURE — 92083 EXTENDED VISUAL FIELD XM: CPT | Performed by: OPHTHALMOLOGY

## 2021-03-19 PROCEDURE — A9585 GADOBUTROL INJECTION: HCPCS | Performed by: OPHTHALMOLOGY

## 2021-03-19 RX ORDER — AMLODIPINE BESYLATE AND ATORVASTATIN CALCIUM 10; 10 MG/1; MG/1
1 TABLET, FILM COATED ORAL DAILY
COMMUNITY

## 2021-03-19 RX ORDER — GADOBUTROL 604.72 MG/ML
9 INJECTION INTRAVENOUS ONCE
Status: COMPLETED | OUTPATIENT
Start: 2021-03-19 | End: 2021-03-19

## 2021-03-19 RX ADMIN — GADOBUTROL 9 ML: 604.72 INJECTION INTRAVENOUS at 07:10

## 2021-03-19 ASSESSMENT — SLIT LAMP EXAM - LIDS
COMMENTS: DERMATOCHALASIS
COMMENTS: 2+ PTOSIS, DERMATOCHALASIS

## 2021-03-19 ASSESSMENT — TONOMETRY
IOP_METHOD: ICARE
OD_IOP_MMHG: 9
OS_IOP_MMHG: 9

## 2021-03-19 ASSESSMENT — CUP TO DISC RATIO
OD_RATIO: 0.2
OS_RATIO: 0.2

## 2021-03-19 ASSESSMENT — CONF VISUAL FIELD
METHOD: COUNTING FINGERS
OS_NORMAL: 1
OD_NORMAL: 1

## 2021-03-19 ASSESSMENT — VISUAL ACUITY
METHOD: SNELLEN - LINEAR
OD_SC+: -2
OS_SC: 20/20
OS_SC+: -1
OD_SC: 20/20

## 2021-03-19 ASSESSMENT — EXTERNAL EXAM - RIGHT EYE: OD_EXAM: NORMAL

## 2021-03-19 ASSESSMENT — MARGIN REFLEX DISTANCE
OS_MRD1: 3
OD_MRD1: 2

## 2021-03-19 ASSESSMENT — MIFFLIN-ST. JEOR: SCORE: 1693.44

## 2021-03-19 ASSESSMENT — EXTERNAL EXAM - LEFT EYE: OS_EXAM: NORMAL

## 2021-03-19 NOTE — NURSING NOTE
Chief Complaints and History of Present Illnesses   Patient presents with     Follow Up     1 year follow up due to Left orbital mass. MRI done this am.     Chief Complaint(s) and History of Present Illness(es)     Follow Up     Laterality: left eye    Associated symptoms: Negative for eye pain, redness and tearing    Treatments tried: no treatments    Pain scale: 0/10    Comments: 1 year follow up due to Left orbital mass. MRI done this am.              Comments     Patient reports no changed noticed since last exam a year ago. No pain. Vision each eye remains stable. No double vision.   Sandrine Joyner, COT COT 8:33 AM March 19, 2021

## 2021-03-19 NOTE — PROGRESS NOTES
Chief Complaints and History of Present Illnesses   Patient presents with     Follow Up     1 year follow up due to Left orbital mass. MRI done this am.     Chief Complaint(s) and History of Present Illness(es)     Follow Up     In left eye.  Associated symptoms include Negative for eye pain, redness   and tearing.  Treatments tried include no treatments.  Pain was noted as   0/10. Additional comments: 1 year follow up due to Left orbital mass. MRI   done this am.              Comments     Patient reports no changed noticed since last exam a year ago. No pain.   Vision each eye remains stable. No double vision.   Sandrine Joyner, COT COT 8:33 AM March 19, 2021             Assessment & Plan     Malik Israel is a 65 year old male with the following diagnoses:   1. Visual field defect    2. Orbital mass - Left Eye      MRI - unchanged ? slighly decreasesd size of left lesion  OCT and GTop Normal    PLAN:  Observe   Return to clinic 1 year AP, DIL, GTop , OCT            Attending Physician Attestation:  Complete documentation of historical and exam elements from today's encounter can be found in the full encounter summary report (not reduplicated in this progress note).  I personally obtained the chief complaint(s) and history of present illness.  I confirmed and edited as necessary the review of systems, past medical/surgical history, family history, social history, and examination findings as documented by others; and I examined the patient myself.  I personally reviewed the relevant tests, images, and reports as documented above.  I formulated and edited as necessary the assessment and plan and discussed the findings and management plan with the patient and family.   -Sander Linder MD  9:08 AM 3/19/2021

## 2021-04-11 ENCOUNTER — HEALTH MAINTENANCE LETTER (OUTPATIENT)
Age: 66
End: 2021-04-11

## 2021-05-16 DIAGNOSIS — R97.20 ELEVATED PSA: Primary | ICD-10-CM

## 2021-05-16 NOTE — PROGRESS NOTES
"Per last office visit  11/2/20 with Linus Obando MD \"Plan  Follow up in 1 year with PSA  Continue Flomax 0.4mg daily  Sildenafil 100mg Rx sent\"        Orders Placed    "

## 2021-09-25 ENCOUNTER — HEALTH MAINTENANCE LETTER (OUTPATIENT)
Age: 66
End: 2021-09-25

## 2021-11-01 ENCOUNTER — LAB (OUTPATIENT)
Dept: LAB | Facility: OTHER | Age: 66
End: 2021-11-01
Attending: UROLOGY
Payer: MEDICARE

## 2021-11-01 ENCOUNTER — OFFICE VISIT (OUTPATIENT)
Dept: UROLOGY | Facility: OTHER | Age: 66
End: 2021-11-01
Attending: UROLOGY
Payer: MEDICARE

## 2021-11-01 VITALS
HEART RATE: 72 BPM | BODY MASS INDEX: 30.28 KG/M2 | WEIGHT: 211 LBS | SYSTOLIC BLOOD PRESSURE: 130 MMHG | DIASTOLIC BLOOD PRESSURE: 82 MMHG | OXYGEN SATURATION: 96 % | RESPIRATION RATE: 16 BRPM

## 2021-11-01 DIAGNOSIS — R97.20 ELEVATED PSA: ICD-10-CM

## 2021-11-01 LAB — PSA SERPL-MCNC: 5.36 UG/L (ref 0–4)

## 2021-11-01 PROCEDURE — 36415 COLL VENOUS BLD VENIPUNCTURE: CPT | Mod: ZL

## 2021-11-01 PROCEDURE — 99213 OFFICE O/P EST LOW 20 MIN: CPT | Performed by: UROLOGY

## 2021-11-01 PROCEDURE — 84153 ASSAY OF PSA TOTAL: CPT | Mod: ZL

## 2021-11-01 PROCEDURE — G0463 HOSPITAL OUTPT CLINIC VISIT: HCPCS

## 2021-11-01 RX ORDER — TAMSULOSIN HYDROCHLORIDE 0.4 MG/1
CAPSULE ORAL
Qty: 90 CAPSULE | Refills: 3 | Status: SHIPPED | OUTPATIENT
Start: 2021-11-01 | End: 2022-11-28

## 2021-11-01 RX ORDER — AMLODIPINE BESYLATE 2.5 MG/1
TABLET ORAL
COMMUNITY
Start: 2021-05-19

## 2021-11-01 ASSESSMENT — PAIN SCALES - GENERAL: PAINLEVEL: NO PAIN (0)

## 2021-11-01 NOTE — PROGRESS NOTES
PCP:   SHARON Cabrera    Type of Visit  EST    Chief Complaint  Elevated PSA    HPI  Mr. Lindy ELENA is a 66 y.o. male who follows up with an elevated PSA.  He does not have a family history of prostate cancer.  The patient has not previously undergone prostate biopsy.  Patient underwent PSA earlier today.  He reports no changes in health in the past year and specifically denies gross hematuria, unintentional weight loss, bone or pelvic pain.  He underwent a PSA earlier and follows up to discuss results.  He was previously treated for ED but no longer prioritizes this issue.      Review of Systems  I personally reviewed the ROS with the patient.    Nursing Notes:   Alicia Jewell, LPN  11/1/2021  9:36 AM  Signed  Pt presents to clinic for a one year elevated PSA follow up    Review of Systems:    Weight loss:    No     Recent fever/chills:  No   Night sweats:   No  Current skin rash:  No   Recent hair loss:  No  Heat intolerance:  No   Cold intolerance:  No  Chest pain:   No   Palpitations:   No  Shortness of breath:  No   Wheezing:   No  Constipation:    No   Diarrhea:   No   Nausea:   No   Vomiting:   No   Kidney/side pain:  No   Back pain:   No  Frequent headaches:  No   Dizziness:     No  Leg swelling:   No   Calf pain:    No        Physical Exam  /82 (BP Location: Left arm, Patient Position: Sitting, Cuff Size: Adult Large)   Pulse 72   Resp 16   Wt 95.7 kg (211 lb)   SpO2 96%   BMI 30.28 kg/m    Constitutional: No acute distress.  Alert and cooperative   Head: NCAT  Eyes: Conjunctivae normal  Cardiovascular: Regular rate.  Pulmonary/Chest: Respirations are even and non-labored bilaterally, no audible wheezing  Abdominal: Soft. No distension, tenderness, masses or guarding.   Neurological: A + O x 3.  Cranial Nerves II-XII grossly intact.  Extremities: CARMEN x 4, Warm. No clubbing.  No cyanosis.    Skin: Pink, warm and dry.  No visible rashes noted.  Psychiatric:  Normal mood and affect  Back:  No  left CVA tenderness.  No right CVA tenderness.  Genitourinary:  Nonpalpable bladder  LLUVIA: 30 g prostate, no nodules, no asymmetry, no tenderness    Labs  Results for AAMIR JOSHI (MRN [id number]) as of 11/1/2021 09:32   11/1/2021 07:03   PSA 5.36 (H)     Results for AAMIR JOSHI (MRN [id number]) as of 11/2/2020 09:06   11/28/2017 09:18 12/4/2018 08:58 11/21/2019 07:34 10/14/2020 12:40   PSA 3.398 (H) 3.951 (H) 4.636 (H) 4.404 (H)     PSA 4.26 1/16/2017    Results for ERIC JOSHI III (MRN [id number]) as of 11/29/2017 08:14   2/20/2017 11:54 11/28/2017 08:35   PSA 3.000 3.398 (H)     Post-Void Residual  A post-void residual was measured by ultrasonic bladder scanner.  65 mL (previously recorded)    Assessment  Mr. Lindy ELENA is a 66 y.o. male who follows up with persistently and mildly elevated but relatively stable PSA.  Normal LLUVIA.  We again discussed the options of biopsy versus observation and we both agree we will continue to trend out the PSA given its stability over the last 2-3 years.    We again discussed options of proceeding to biopsy, prostate MRI, urine testing, serial PSAs.  After a long discussion involving the risks and benefits and alternatives we decided to proceed with serial PSA testing.    Plan  Follow up in 6 months with a repeat PSA  Continue Flomax 0.4mg daily

## 2021-11-01 NOTE — NURSING NOTE
Pt presents to clinic for a one year elevated PSA follow up    Review of Systems:    Weight loss:    No     Recent fever/chills:  No   Night sweats:   No  Current skin rash:  No   Recent hair loss:  No  Heat intolerance:  No   Cold intolerance:  No  Chest pain:   No   Palpitations:   No  Shortness of breath:  No   Wheezing:   No  Constipation:    No   Diarrhea:   No   Nausea:   No   Vomiting:   No   Kidney/side pain:  No   Back pain:   No  Frequent headaches:  No   Dizziness:     No  Leg swelling:   No   Calf pain:    No

## 2022-03-18 ENCOUNTER — OFFICE VISIT (OUTPATIENT)
Dept: OPHTHALMOLOGY | Facility: CLINIC | Age: 67
End: 2022-03-18
Payer: COMMERCIAL

## 2022-03-18 VITALS — WEIGHT: 205 LBS | BODY MASS INDEX: 30.36 KG/M2 | HEIGHT: 69 IN

## 2022-03-18 DIAGNOSIS — H53.40 VISUAL FIELD DEFECT: Primary | ICD-10-CM

## 2022-03-18 DIAGNOSIS — H05.89 ORBITAL MASS: ICD-10-CM

## 2022-03-18 PROCEDURE — 92133 CPTRZD OPH DX IMG PST SGM ON: CPT | Performed by: OPHTHALMOLOGY

## 2022-03-18 PROCEDURE — 92083 EXTENDED VISUAL FIELD XM: CPT | Performed by: OPHTHALMOLOGY

## 2022-03-18 PROCEDURE — 92014 COMPRE OPH EXAM EST PT 1/>: CPT | Performed by: OPHTHALMOLOGY

## 2022-03-18 ASSESSMENT — VISUAL ACUITY
OS_CC: J1+/-
OD_CC: J1+/-
METHOD: SNELLEN - LINEAR
CORRECTION_TYPE: GLASSES
OS_SC+: -3
OD_SC: 20/20
OS_SC: 20/20

## 2022-03-18 ASSESSMENT — SLIT LAMP EXAM - LIDS
COMMENTS: DERMATOCHALASIS
COMMENTS: 2+ PTOSIS, DERMATOCHALASIS

## 2022-03-18 ASSESSMENT — CONF VISUAL FIELD
OS_NORMAL: 1
METHOD: COUNTING FINGERS
OD_NORMAL: 1

## 2022-03-18 ASSESSMENT — CUP TO DISC RATIO
OS_RATIO: 0.2
OD_RATIO: 0.2

## 2022-03-18 ASSESSMENT — TONOMETRY
OS_IOP_MMHG: 10
IOP_METHOD: ICARE
OD_IOP_MMHG: 10

## 2022-03-18 ASSESSMENT — REFRACTION_WEARINGRX
OS_SPHERE: +1.75
OD_CYLINDER: SPHERE
OS_CYLINDER: SPHERE
OD_SPHERE: +1.75

## 2022-03-18 ASSESSMENT — REFRACTION_MANIFEST
OS_CYLINDER: SPHERE
OS_SPHERE: +0.50
OD_ADD: +2.00
OD_SPHERE: PLANO
OD_CYLINDER: SPHERE
OS_ADD: +2.00

## 2022-03-18 ASSESSMENT — MARGIN REFLEX DISTANCE
OD_MRD1: 2
OS_MRD1: 3

## 2022-03-18 ASSESSMENT — EXTERNAL EXAM - LEFT EYE: OS_EXAM: NORMAL

## 2022-03-18 ASSESSMENT — EXTERNAL EXAM - RIGHT EYE: OD_EXAM: NORMAL

## 2022-03-18 NOTE — PROGRESS NOTES
Chief Complaints and History of Present Illnesses   Patient presents with     Follow Up     Visual field defect/ Orbital mass - Left Eye          Chief Complaint(s) and History of Present Illness(es)     Follow Up     In left eye.  Associated symptoms include swelling.  Negative for eye   pain, tearing and discharge.  Pain was noted as 1/10. Additional comments:   Visual field defect/ Orbital mass - Left Eye                   Comments     Does get some intermittent pain with left eye that began since last   visit. No notices of pattern or frequency. When in happening it lingers   for minutes then resolve. Vision remains stable no double vision,   discharge, or swelling.   Sandrine Joyner, COT COT 7:39 AM March 18, 2022             Assessment & Plan     Malik Israel is a 66 year old male with the following diagnoses:   1. Visual field defect    2. Orbital mass - Left Eye      MRI 2021 - small inferior lesion has gotten smaller    OCT - normal/stable  GTop both eyes normal    Has been stable for 12 years at this point. I think he can follow up with Dr. Banerjee and come back to see us if something changes.   PLAN:  Follow up with ophthalmologist close to home  Imaging if symptom change in future.          Attending Physician Attestation:  Complete documentation of historical and exam elements from today's encounter can be found in the full encounter summary report (not reduplicated in this progress note).  I personally obtained the chief complaint(s) and history of present illness.  I confirmed and edited as necessary the review of systems, past medical/surgical history, family history, social history, and examination findings as documented by others; and I examined the patient myself.  I personally reviewed the relevant tests, images, and reports as documented above.  I formulated and edited as necessary the assessment and plan and discussed the findings and management plan with the patient and family. I personally  reviewed the ophthalmic test(s) associated with this encounter, and have completed the corresponding report(s) as necessary.   -Sander Linder MD

## 2022-03-18 NOTE — NURSING NOTE
Chief Complaints and History of Present Illnesses   Patient presents with     Follow Up     Visual field defect/ Orbital mass - Left Eye          Chief Complaint(s) and History of Present Illness(es)     Follow Up     Laterality: left eye    Associated symptoms: swelling.  Negative for eye pain, tearing and discharge    Pain scale: 1/10    Comments: Visual field defect/ Orbital mass - Left Eye                   Comments     Does get some intermittent pain with left eye that began since last visit. No notices of pattern or frequency. When in happening it lingers for minutes then resolve. Vision remains stable no double vision, discharge, or swelling.   Sandrine Joyner, COT COT 7:39 AM March 18, 2022

## 2022-03-18 NOTE — LETTER
3/18/2022         RE:  :  MRN: Malik Israel  1955  [id number]     Dear Dr. Sander Linder,    Your patient, Malik Israel, returned for oculoplastic follow up. My assessment and plan are below.  For further details, please see my attached clinic note.      Assessment & Plan     Malik Israel is a 66 year old male with the following diagnoses:   1. Visual field defect    2. Orbital mass - Left Eye      OCT - normal/stable  GTop both eyes normal    PLAN:  Follow up with ophthalmologist close to home  Imaging if symptom change in future.        Again, thank you for allowing me to participate in the care of your patient.      Sincerely,    Sander Linder MD  Department of Ophthalmology and Visual Neurosciences  [facility]      CC: Anabella Ro NP  [facility]  [address]   [facility] [address]  Via Fax: [phone]     Sander Linder MD  [address] [facility] [address]  [facility] [address]  Via In Basket     Fransico Banerjee MD  [facility]  [address] [facility] [address]  Via Fax: [phone]

## 2022-03-22 DIAGNOSIS — R97.20 ELEVATED PSA: Primary | ICD-10-CM

## 2022-03-22 NOTE — PROGRESS NOTES
"Per last office visit  11/01/21 with Linus Obando MD \"Plan  Follow up in 6 months with a repeat PSA  Continue Flomax 0.4mg daily\"        Orders Placed  Citlali Peterson LPN on 3/22/2022 at 1:59 PM    "

## 2022-04-29 ENCOUNTER — LAB (OUTPATIENT)
Dept: LAB | Facility: OTHER | Age: 67
End: 2022-04-29
Attending: UROLOGY
Payer: MEDICARE

## 2022-04-29 DIAGNOSIS — R97.20 ELEVATED PSA: ICD-10-CM

## 2022-04-29 LAB — PSA SERPL-MCNC: 5.47 UG/L (ref 0–4)

## 2022-04-29 PROCEDURE — 84153 ASSAY OF PSA TOTAL: CPT | Mod: ZL

## 2022-04-29 PROCEDURE — 36415 COLL VENOUS BLD VENIPUNCTURE: CPT | Mod: ZL

## 2022-05-04 ENCOUNTER — OFFICE VISIT (OUTPATIENT)
Dept: UROLOGY | Facility: OTHER | Age: 67
End: 2022-05-04
Attending: UROLOGY
Payer: COMMERCIAL

## 2022-05-04 VITALS
HEART RATE: 66 BPM | SYSTOLIC BLOOD PRESSURE: 138 MMHG | DIASTOLIC BLOOD PRESSURE: 82 MMHG | WEIGHT: 208.6 LBS | BODY MASS INDEX: 30.8 KG/M2 | OXYGEN SATURATION: 96 % | RESPIRATION RATE: 16 BRPM

## 2022-05-04 DIAGNOSIS — R35.0 BENIGN PROSTATIC HYPERPLASIA WITH URINARY FREQUENCY: ICD-10-CM

## 2022-05-04 DIAGNOSIS — R97.20 ELEVATED PSA: Primary | ICD-10-CM

## 2022-05-04 DIAGNOSIS — N40.1 BENIGN PROSTATIC HYPERPLASIA WITH URINARY FREQUENCY: ICD-10-CM

## 2022-05-04 PROCEDURE — G0463 HOSPITAL OUTPT CLINIC VISIT: HCPCS

## 2022-05-04 PROCEDURE — 99213 OFFICE O/P EST LOW 20 MIN: CPT | Performed by: UROLOGY

## 2022-05-04 ASSESSMENT — PAIN SCALES - GENERAL: PAINLEVEL: NO PAIN (0)

## 2022-05-04 NOTE — NURSING NOTE
Pt presents to clinic for six month elevated PSA follow up  Review of Systems:    Weight loss:    No     Recent fever/chills:  No   Night sweats:   No  Current skin rash:  No   Recent hair loss:  No  Heat intolerance:  No   Cold intolerance:  No  Chest pain:   No   Palpitations:   No  Shortness of breath:  No   Wheezing:   No  Constipation:    No   Diarrhea:   No   Nausea:   No   Vomiting:   No   Kidney/side pain:  No   Back pain:   No  Frequent headaches:  No   Dizziness:     No  Leg swelling:   No   Calf pain:    No

## 2022-05-04 NOTE — PROGRESS NOTES
PCP:   SHARON Cabrera    Type of Visit  EST    Chief Complaint  Elevated PSA    HPI  Mr. Lindy ELENA is a 66 y.o. male who follows up with an elevated PSA.  He does not have a family history of prostate cancer.  The patient has not previously undergone prostate biopsy.  Patient underwent PSA about 1 week ago.  This reflected a 6-month interval from the prior PSA given he has a mildly elevated value.  He reports no changes in symptoms or health in the last 6 months.  He follows up to discuss results of the PSA.      Review of Systems  I personally reviewed the ROS with the patient.    Nursing Notes:   Alicia Jewell, LPN  5/4/2022  8:16 AM  Signed  Pt presents to clinic for six month elevated PSA follow up  Review of Systems:    Weight loss:    No     Recent fever/chills:  No   Night sweats:   No  Current skin rash:  No   Recent hair loss:  No  Heat intolerance:  No   Cold intolerance:  No  Chest pain:   No   Palpitations:   No  Shortness of breath:  No   Wheezing:   No  Constipation:    No   Diarrhea:   No   Nausea:   No   Vomiting:   No   Kidney/side pain:  No   Back pain:   No  Frequent headaches:  No   Dizziness:     No  Leg swelling:   No   Calf pain:    No            Physical Exam  /82 (BP Location: Right arm, Patient Position: Sitting, Cuff Size: Adult Large)   Pulse 66   Resp 16   Wt 94.6 kg (208 lb 9.6 oz)   SpO2 96%   BMI 30.80 kg/m    Constitutional: No acute distress.  Alert and cooperative   Head: NCAT  Eyes: Conjunctivae normal  Cardiovascular: Regular rate.  Pulmonary/Chest: Respirations are even and non-labored bilaterally, no audible wheezing  Abdominal: Soft. No distension, tenderness, masses or guarding.   Neurological: A + O x 3.  Cranial Nerves II-XII grossly intact.  Extremities: CARMEN x 4, Warm. No clubbing.  No cyanosis.    Skin: Pink, warm and dry.  No visible rashes noted.  Psychiatric:  Normal mood and affect  Back:  No left CVA tenderness.  No right CVA  tenderness.  Genitourinary:  Nonpalpable bladder  LLUVIA: 30 g prostate, no nodules, no asymmetry, no tenderness    Labs   11/01/21 07:03 04/29/22 06:56   PSA 5.36 (H) 5.47 (H)     Results for AAMIR JOSHI (MRN [id number]) as of 11/2/2020 09:06   11/28/2017 09:18 12/4/2018 08:58 11/21/2019 07:34 10/14/2020 12:40   PSA 3.398 (H) 3.951 (H) 4.636 (H) 4.404 (H)     PSA 4.26 1/16/2017    Results for ERIC JOSHI III (MRN [id number]) as of 11/29/2017 08:14   2/20/2017 11:54 11/28/2017 08:35   PSA 3.000 3.398 (H)     Post-Void Residual  A post-void residual was measured by ultrasonic bladder scanner.  65 mL (previously recorded)    Assessment  Mr. Lindy ELENA is a 66 y.o. male who follows up with persistently and mildly elevated but relatively stable PSA.  Normal LLUVIA.    We again discussed options of proceeding to biopsy, prostate MRI, urine testing, serial PSAs.  After a long discussion involving the risks and benefits and alternatives we decided to proceed with serial PSA testing.    Utilizing the [employer] calculator we discussed the following based on his current personalized data:  8% Risk of high grade cancer detected on biopsy  20% Risk of low grade cancer detected on biopsy  72% Chance of benign biopsy    Plan  Follow up in 1 year with a PSA  Continue Flomax 0.4mg daily

## 2022-11-25 DIAGNOSIS — R97.20 ELEVATED PSA: ICD-10-CM

## 2022-11-25 NOTE — TELEPHONE ENCOUNTER
Patient called 11.25.22  Out of medication. Needs refill     Cheryl Bustillo on 11/25/2022 at 9:41 AM

## 2022-11-28 RX ORDER — TAMSULOSIN HYDROCHLORIDE 0.4 MG/1
CAPSULE ORAL
Qty: 90 CAPSULE | Refills: 3 | Status: SHIPPED | OUTPATIENT
Start: 2022-11-28

## 2022-11-28 NOTE — TELEPHONE ENCOUNTER
Per visit on 05/04/22 Plan  Follow up in 1 year with a PSA  Continue Flomax 0.4mg daily    Medication Pended and sent to provider.  Citlali Peterson LPN on 11/28/2022 at 8:54 AM

## 2023-04-24 ENCOUNTER — ANCILLARY ORDERS (OUTPATIENT)
Dept: MRI IMAGING | Facility: OTHER | Age: 68
End: 2023-04-24

## 2023-04-24 DIAGNOSIS — R97.20 ELEVATED PSA: ICD-10-CM
